# Patient Record
Sex: FEMALE | Race: ASIAN | NOT HISPANIC OR LATINO | URBAN - METROPOLITAN AREA
[De-identification: names, ages, dates, MRNs, and addresses within clinical notes are randomized per-mention and may not be internally consistent; named-entity substitution may affect disease eponyms.]

---

## 2017-03-01 ENCOUNTER — INPATIENT (INPATIENT)
Facility: HOSPITAL | Age: 82
LOS: 7 days | Discharge: NURSING, REHAB OR LONG-TERM CARE | DRG: 310 | End: 2017-03-09
Attending: FAMILY MEDICINE | Admitting: FAMILY MEDICINE
Payer: MEDICARE

## 2017-03-01 VITALS
DIASTOLIC BLOOD PRESSURE: 78 MMHG | TEMPERATURE: 99 F | SYSTOLIC BLOOD PRESSURE: 167 MMHG | OXYGEN SATURATION: 97 % | RESPIRATION RATE: 18 BRPM | HEART RATE: 63 BPM

## 2017-03-01 LAB
ALBUMIN SERPL ELPH-MCNC: 4.3 G/DL — SIGNIFICANT CHANGE UP (ref 3.3–5)
ALP SERPL-CCNC: 60 U/L — SIGNIFICANT CHANGE UP (ref 40–120)
ALT FLD-CCNC: 16 U/L RC — SIGNIFICANT CHANGE UP (ref 10–45)
ANION GAP SERPL CALC-SCNC: 18 MMOL/L — HIGH (ref 5–17)
AST SERPL-CCNC: 18 U/L — SIGNIFICANT CHANGE UP (ref 10–40)
BASOPHILS # BLD AUTO: 0.1 K/UL — SIGNIFICANT CHANGE UP (ref 0–0.2)
BASOPHILS NFR BLD AUTO: 0.6 % — SIGNIFICANT CHANGE UP (ref 0–2)
BILIRUB SERPL-MCNC: 0.2 MG/DL — SIGNIFICANT CHANGE UP (ref 0.2–1.2)
BUN SERPL-MCNC: 32 MG/DL — HIGH (ref 7–23)
CALCIUM SERPL-MCNC: 10.1 MG/DL — SIGNIFICANT CHANGE UP (ref 8.4–10.5)
CHLORIDE SERPL-SCNC: 96 MMOL/L — SIGNIFICANT CHANGE UP (ref 96–108)
CK SERPL-CCNC: 121 U/L — SIGNIFICANT CHANGE UP (ref 25–170)
CO2 SERPL-SCNC: 25 MMOL/L — SIGNIFICANT CHANGE UP (ref 22–31)
CREAT SERPL-MCNC: 0.96 MG/DL — SIGNIFICANT CHANGE UP (ref 0.5–1.3)
EOSINOPHIL # BLD AUTO: 0.1 K/UL — SIGNIFICANT CHANGE UP (ref 0–0.5)
EOSINOPHIL NFR BLD AUTO: 1.1 % — SIGNIFICANT CHANGE UP (ref 0–6)
GAS PNL BLDV: SIGNIFICANT CHANGE UP
GLUCOSE SERPL-MCNC: 179 MG/DL — HIGH (ref 70–99)
HCT VFR BLD CALC: 36.8 % — SIGNIFICANT CHANGE UP (ref 34.5–45)
HGB BLD-MCNC: 13.2 G/DL — SIGNIFICANT CHANGE UP (ref 11.5–15.5)
LYMPHOCYTES # BLD AUTO: 1.9 K/UL — SIGNIFICANT CHANGE UP (ref 1–3.3)
LYMPHOCYTES # BLD AUTO: 19.8 % — SIGNIFICANT CHANGE UP (ref 13–44)
MCHC RBC-ENTMCNC: 33.9 PG — SIGNIFICANT CHANGE UP (ref 27–34)
MCHC RBC-ENTMCNC: 35.7 GM/DL — SIGNIFICANT CHANGE UP (ref 32–36)
MCV RBC AUTO: 95 FL — SIGNIFICANT CHANGE UP (ref 80–100)
MONOCYTES # BLD AUTO: 1 K/UL — HIGH (ref 0–0.9)
MONOCYTES NFR BLD AUTO: 10.5 % — SIGNIFICANT CHANGE UP (ref 2–14)
NEUTROPHILS # BLD AUTO: 6.4 K/UL — SIGNIFICANT CHANGE UP (ref 1.8–7.4)
NEUTROPHILS NFR BLD AUTO: 68 % — SIGNIFICANT CHANGE UP (ref 43–77)
PLATELET # BLD AUTO: 248 K/UL — SIGNIFICANT CHANGE UP (ref 150–400)
POTASSIUM SERPL-MCNC: 4.4 MMOL/L — SIGNIFICANT CHANGE UP (ref 3.5–5.3)
POTASSIUM SERPL-SCNC: 4.4 MMOL/L — SIGNIFICANT CHANGE UP (ref 3.5–5.3)
PROT SERPL-MCNC: 7.4 G/DL — SIGNIFICANT CHANGE UP (ref 6–8.3)
RBC # BLD: 3.88 M/UL — SIGNIFICANT CHANGE UP (ref 3.8–5.2)
RBC # FLD: 12.2 % — SIGNIFICANT CHANGE UP (ref 10.3–14.5)
SODIUM SERPL-SCNC: 139 MMOL/L — SIGNIFICANT CHANGE UP (ref 135–145)
TROPONIN T SERPL-MCNC: <0.01 NG/ML — SIGNIFICANT CHANGE UP (ref 0–0.06)
WBC # BLD: 9.4 K/UL — SIGNIFICANT CHANGE UP (ref 3.8–10.5)
WBC # FLD AUTO: 9.4 K/UL — SIGNIFICANT CHANGE UP (ref 3.8–10.5)

## 2017-03-01 PROCEDURE — 93010 ELECTROCARDIOGRAM REPORT: CPT

## 2017-03-01 PROCEDURE — 71010: CPT | Mod: 26

## 2017-03-01 PROCEDURE — 99284 EMERGENCY DEPT VISIT MOD MDM: CPT | Mod: 25

## 2017-03-01 RX ORDER — ACETAMINOPHEN 500 MG
975 TABLET ORAL ONCE
Qty: 0 | Refills: 0 | Status: COMPLETED | OUTPATIENT
Start: 2017-03-01 | End: 2017-03-01

## 2017-03-01 RX ADMIN — Medication 975 MILLIGRAM(S): at 22:21

## 2017-03-01 NOTE — ED ADULT NURSE NOTE - OBJECTIVE STATEMENT
Pt is a 89 yo female BIB daughters s/p unwitnessed fall at home. Pt is unsure of how/why she fell. Pt fell between 130 -5 she is unsure of why the fall happened. Pt c.o left knee pain and left forehead pain. Pt has a bruise to her left forehead. Pt denies any vision changes no N/V/D no CP or SOB no diff urinating. Pt denies any numbness or tingling. Pt usually uses a cane at home, but recently daughters have been trying to encourage her to use a walker. Pt has a pmh of DM and HTN

## 2017-03-01 NOTE — ED ADULT NURSE NOTE - PMH
Back pain    Diabetes Mellitus Type II    Diverticulitis    HTN (Hypertension)    Hypercholesteremia    Osteoporosis

## 2017-03-01 NOTE — ED PROVIDER NOTE - ATTENDING CONTRIBUTION TO CARE
Agree with resident history  Patient denies any cp , sob, n/v, palpitations, , GYN, ENT or URI sx or GI bleeding  No hx of CAD or MI  On exam, AVSS, thin, fragile, hematoma to left temple with no deformity or bean sign, mild ttp along cpsine and t spine, no deformity, no cw ttp or bruising, soft nt nd abdomen, ttp on left illiac crest, no femur ttp, + ttp in knee cap, no tp ttp,

## 2017-03-01 NOTE — ED PROVIDER NOTE - OBJECTIVE STATEMENT
89F phmx of htn, dm BIB by daughters from home sp unwitnessed fall. pt does not recall events preceding the fall. +head trauma. Was able to pull herself to stand and was ambulatory after (answered door to let daughter in). Pt endorse left forehead pain and left knee pain. Did not take otc meds for pain. Denies focal numbness or parasthesia. neck pain, change in vision, nausea or vomiting, fever, cough, rhinorrhea, rash, chest pain, sob, pleuritic pain, diarrhea, constipation, pain with eye movement or jaw opening. 89F phmx of htn, dm BIB by daughters from home sp unwitnessed fall. pt does not recall events preceding the fall. +head trauma. Was able to pull herself to stand and was ambulatory after (answered door to let daughter in). Pt endorse left forehead pain and left knee pain. Did not take otc meds for pain. Denies focal numbness or parasthesia. neck pain, change in vision, nausea or vomiting, fever, cough, rhinorrhea, rash, chest pain, sob, pleuritic pain, diarrhea, constipation, pain with eye movement or jaw opening.    PMD: Romeo Chu

## 2017-03-01 NOTE — ED PROVIDER NOTE - PROGRESS NOTE DETAILS
Discussed with Dr Fontana - would like to admit to keke elam. Unable to reach Dr. Elam  Will admit to unattached.

## 2017-03-01 NOTE — ED PROVIDER NOTE - MEDICAL DECISION MAKING DETAILS
sp unwitnessed fall of unknown etiology. PE significant for left frontal hematoma and thoracic spine tendnerness. will obtain labs, cth, xr, cardiac hadley and admit.

## 2017-03-01 NOTE — ED PROVIDER NOTE - PHYSICAL EXAMINATION
AAOX3, mild distress 2/2 pain. HEENT: left frontal hematoma, no bony depression. EOMI, no periorbital tenderness, pupils equal, +2 reactive, nares is clear, maxillae stable. OP atraumatic and clear. Neck Supple, no midline ttep, RRR, breaths sounds equal. no pleuritic pain, no chest wall crepitus or deformity. ABD S/NT/ND, left shoulder pain with extension, no ttp or deformity. midline tspine tenderness with deformity or ecchymosis,  l spine w/o tenderness. EXT warm, well perfused, Pain in left hip on flexion. pelvis stable. no pain with passive rom. tenderness over left patella without deformity or ecchymosis. No Edema, Distal Pulses Intact, No Rash,  MAEx4, Sensation to soft touch grossly intact, normal strength/tone.

## 2017-03-02 DIAGNOSIS — R55 SYNCOPE AND COLLAPSE: ICD-10-CM

## 2017-03-02 DIAGNOSIS — Z29.9 ENCOUNTER FOR PROPHYLACTIC MEASURES, UNSPECIFIED: ICD-10-CM

## 2017-03-02 DIAGNOSIS — I10 ESSENTIAL (PRIMARY) HYPERTENSION: ICD-10-CM

## 2017-03-02 DIAGNOSIS — E11.9 TYPE 2 DIABETES MELLITUS WITHOUT COMPLICATIONS: ICD-10-CM

## 2017-03-02 LAB
ANION GAP SERPL CALC-SCNC: 16 MMOL/L — SIGNIFICANT CHANGE UP (ref 5–17)
APPEARANCE UR: CLEAR — SIGNIFICANT CHANGE UP
BASOPHILS # BLD AUTO: 0.02 K/UL — SIGNIFICANT CHANGE UP (ref 0–0.2)
BASOPHILS NFR BLD AUTO: 0.2 % — SIGNIFICANT CHANGE UP (ref 0–2)
BILIRUB UR-MCNC: NEGATIVE — SIGNIFICANT CHANGE UP
BUN SERPL-MCNC: 25 MG/DL — HIGH (ref 7–23)
CALCIUM SERPL-MCNC: 9.9 MG/DL — SIGNIFICANT CHANGE UP (ref 8.4–10.5)
CHLORIDE SERPL-SCNC: 100 MMOL/L — SIGNIFICANT CHANGE UP (ref 96–108)
CK MB CFR SERPL CALC: 2.1 NG/ML — SIGNIFICANT CHANGE UP (ref 0–3.8)
CK MB CFR SERPL CALC: 3.1 NG/ML — SIGNIFICANT CHANGE UP (ref 0–3.8)
CK SERPL-CCNC: 78 U/L — SIGNIFICANT CHANGE UP (ref 25–170)
CK SERPL-CCNC: 92 U/L — SIGNIFICANT CHANGE UP (ref 25–170)
CO2 SERPL-SCNC: 24 MMOL/L — SIGNIFICANT CHANGE UP (ref 22–31)
COLOR SPEC: SIGNIFICANT CHANGE UP
CREAT SERPL-MCNC: 0.81 MG/DL — SIGNIFICANT CHANGE UP (ref 0.5–1.3)
DIFF PNL FLD: NEGATIVE — SIGNIFICANT CHANGE UP
EOSINOPHIL # BLD AUTO: 0.09 K/UL — SIGNIFICANT CHANGE UP (ref 0–0.5)
EOSINOPHIL NFR BLD AUTO: 1.1 % — SIGNIFICANT CHANGE UP (ref 0–6)
GLUCOSE SERPL-MCNC: 137 MG/DL — HIGH (ref 70–99)
GLUCOSE UR QL: NEGATIVE — SIGNIFICANT CHANGE UP
HCT VFR BLD CALC: 36.8 % — SIGNIFICANT CHANGE UP (ref 34.5–45)
HGB BLD-MCNC: 12.4 G/DL — SIGNIFICANT CHANGE UP (ref 11.5–15.5)
IMM GRANULOCYTES NFR BLD AUTO: 0.2 % — SIGNIFICANT CHANGE UP (ref 0–1.5)
KETONES UR-MCNC: NEGATIVE — SIGNIFICANT CHANGE UP
LEUKOCYTE ESTERASE UR-ACNC: ABNORMAL
LYMPHOCYTES # BLD AUTO: 2.03 K/UL — SIGNIFICANT CHANGE UP (ref 1–3.3)
LYMPHOCYTES # BLD AUTO: 23.7 % — SIGNIFICANT CHANGE UP (ref 13–44)
MAGNESIUM SERPL-MCNC: 1.8 MG/DL — SIGNIFICANT CHANGE UP (ref 1.6–2.6)
MCHC RBC-ENTMCNC: 32 PG — SIGNIFICANT CHANGE UP (ref 27–34)
MCHC RBC-ENTMCNC: 33.7 GM/DL — SIGNIFICANT CHANGE UP (ref 32–36)
MCV RBC AUTO: 94.8 FL — SIGNIFICANT CHANGE UP (ref 80–100)
MONOCYTES # BLD AUTO: 0.86 K/UL — SIGNIFICANT CHANGE UP (ref 0–0.9)
MONOCYTES NFR BLD AUTO: 10.1 % — SIGNIFICANT CHANGE UP (ref 2–14)
NEUTROPHILS # BLD AUTO: 5.53 K/UL — SIGNIFICANT CHANGE UP (ref 1.8–7.4)
NEUTROPHILS NFR BLD AUTO: 64.7 % — SIGNIFICANT CHANGE UP (ref 43–77)
NITRITE UR-MCNC: NEGATIVE — SIGNIFICANT CHANGE UP
PH UR: 7 — SIGNIFICANT CHANGE UP (ref 4.8–8)
PHOSPHATE SERPL-MCNC: 4.1 MG/DL — SIGNIFICANT CHANGE UP (ref 2.5–4.5)
PLATELET # BLD AUTO: 265 K/UL — SIGNIFICANT CHANGE UP (ref 150–400)
POTASSIUM SERPL-MCNC: 4.4 MMOL/L — SIGNIFICANT CHANGE UP (ref 3.5–5.3)
POTASSIUM SERPL-SCNC: 4.4 MMOL/L — SIGNIFICANT CHANGE UP (ref 3.5–5.3)
PROT UR-MCNC: NEGATIVE — SIGNIFICANT CHANGE UP
RBC # BLD: 3.88 M/UL — SIGNIFICANT CHANGE UP (ref 3.8–5.2)
RBC # FLD: 12.9 % — SIGNIFICANT CHANGE UP (ref 10.3–14.5)
RBC CASTS # UR COMP ASSIST: SIGNIFICANT CHANGE UP /HPF (ref 0–2)
SODIUM SERPL-SCNC: 140 MMOL/L — SIGNIFICANT CHANGE UP (ref 135–145)
SP GR SPEC: 1.01 — SIGNIFICANT CHANGE UP (ref 1.01–1.02)
TROPONIN T SERPL-MCNC: <0.01 NG/ML — SIGNIFICANT CHANGE UP (ref 0–0.06)
TROPONIN T SERPL-MCNC: <0.01 NG/ML — SIGNIFICANT CHANGE UP (ref 0–0.06)
UROBILINOGEN FLD QL: NEGATIVE — SIGNIFICANT CHANGE UP
WBC # BLD: 8.55 K/UL — SIGNIFICANT CHANGE UP (ref 3.8–10.5)
WBC # FLD AUTO: 8.55 K/UL — SIGNIFICANT CHANGE UP (ref 3.8–10.5)
WBC UR QL: SIGNIFICANT CHANGE UP /HPF (ref 0–5)

## 2017-03-02 PROCEDURE — 99222 1ST HOSP IP/OBS MODERATE 55: CPT

## 2017-03-02 PROCEDURE — 73610 X-RAY EXAM OF ANKLE: CPT | Mod: 26,50

## 2017-03-02 PROCEDURE — 73630 X-RAY EXAM OF FOOT: CPT | Mod: 26,50

## 2017-03-02 RX ORDER — DEXTROSE 50 % IN WATER 50 %
1 SYRINGE (ML) INTRAVENOUS ONCE
Qty: 0 | Refills: 0 | Status: DISCONTINUED | OUTPATIENT
Start: 2017-03-02 | End: 2017-03-09

## 2017-03-02 RX ORDER — ACETAMINOPHEN 500 MG
650 TABLET ORAL EVERY 6 HOURS
Qty: 0 | Refills: 0 | Status: DISCONTINUED | OUTPATIENT
Start: 2017-03-02 | End: 2017-03-09

## 2017-03-02 RX ORDER — HEPARIN SODIUM 5000 [USP'U]/ML
5000 INJECTION INTRAVENOUS; SUBCUTANEOUS EVERY 12 HOURS
Qty: 0 | Refills: 0 | Status: DISCONTINUED | OUTPATIENT
Start: 2017-03-02 | End: 2017-03-09

## 2017-03-02 RX ORDER — DEXTROSE 50 % IN WATER 50 %
12.5 SYRINGE (ML) INTRAVENOUS ONCE
Qty: 0 | Refills: 0 | Status: DISCONTINUED | OUTPATIENT
Start: 2017-03-02 | End: 2017-03-09

## 2017-03-02 RX ORDER — INSULIN LISPRO 100/ML
VIAL (ML) SUBCUTANEOUS
Qty: 0 | Refills: 0 | Status: DISCONTINUED | OUTPATIENT
Start: 2017-03-02 | End: 2017-03-09

## 2017-03-02 RX ORDER — ATORVASTATIN CALCIUM 80 MG/1
20 TABLET, FILM COATED ORAL AT BEDTIME
Qty: 0 | Refills: 0 | Status: DISCONTINUED | OUTPATIENT
Start: 2017-03-02 | End: 2017-03-09

## 2017-03-02 RX ORDER — INSULIN LISPRO 100/ML
VIAL (ML) SUBCUTANEOUS AT BEDTIME
Qty: 0 | Refills: 0 | Status: DISCONTINUED | OUTPATIENT
Start: 2017-03-02 | End: 2017-03-09

## 2017-03-02 RX ADMIN — ATORVASTATIN CALCIUM 20 MILLIGRAM(S): 80 TABLET, FILM COATED ORAL at 21:22

## 2017-03-02 RX ADMIN — Medication 650 MILLIGRAM(S): at 09:42

## 2017-03-02 RX ADMIN — Medication 650 MILLIGRAM(S): at 16:27

## 2017-03-02 RX ADMIN — HEPARIN SODIUM 5000 UNIT(S): 5000 INJECTION INTRAVENOUS; SUBCUTANEOUS at 06:38

## 2017-03-02 RX ADMIN — Medication 650 MILLIGRAM(S): at 17:00

## 2017-03-02 RX ADMIN — HEPARIN SODIUM 5000 UNIT(S): 5000 INJECTION INTRAVENOUS; SUBCUTANEOUS at 17:26

## 2017-03-02 RX ADMIN — Medication 6: at 17:32

## 2017-03-02 RX ADMIN — Medication 4: at 14:29

## 2017-03-02 NOTE — H&P ADULT. - ASSESSMENT
The patient is a healthy 89 Y.o. female with pmh of T2DM, HTN, who presents s/p un witnessed fall at home.

## 2017-03-02 NOTE — H&P ADULT. - HISTORY OF PRESENT ILLNESS
The patient is a healthy 89 Y.o. female with pmh of T2DM, HTN, who presents s/p un witnessed fall at home.     The patient is usually functional, does adl's, supposed to use a walker but does at times. She has had mechanical falls in the past. She was found to have fallen at home. She was last seen by daughter around lunch time. When she came back home the patient opened the door for her but was found to have bruise on the left face. She told her daugher she does not remember when/how she fell and does not remember how long she was down for. She denies and prodromal symptoms but doesn't remember what she was doing prior to her fall. She remembers coming to and being unable to arise easily. She was able to sit up and when her daughter came was able to open the door for her. No CP/SOB/ light headedness, no change in vison, or stool/urinary incontinence to togue biting. No change in exercise capacity. The patient was brought to the ED.     In the ED CT scan of head, neck, thoracic was negative for acute pathology. Admitted to tele. Of note the daughter states her mom has a history of valve issues where it does not close all the way. She however did not know any further details.

## 2017-03-02 NOTE — H&P ADULT. - PROBLEM SELECTOR PLAN 1
S/P fall, history of mechanical falls, no sz like activity, unclear how long, unwitnessed, history of possible valvular disease, systolic murmur on exam.   Mechanical vs cardiogenic.   Check orthostatics,   Check TTE, monitor on TELE.  Hold BB for now.   PT nicholas.

## 2017-03-03 LAB
ANION GAP SERPL CALC-SCNC: 14 MMOL/L — SIGNIFICANT CHANGE UP (ref 5–17)
BUN SERPL-MCNC: 18 MG/DL — SIGNIFICANT CHANGE UP (ref 7–23)
CALCIUM SERPL-MCNC: 9.3 MG/DL — SIGNIFICANT CHANGE UP (ref 8.4–10.5)
CHLORIDE SERPL-SCNC: 102 MMOL/L — SIGNIFICANT CHANGE UP (ref 96–108)
CK MB BLD-MCNC: 2.9 % — SIGNIFICANT CHANGE UP (ref 0–3.5)
CK MB CFR SERPL CALC: 3 NG/ML — SIGNIFICANT CHANGE UP (ref 0–3.8)
CK SERPL-CCNC: 104 U/L — SIGNIFICANT CHANGE UP (ref 25–170)
CO2 SERPL-SCNC: 25 MMOL/L — SIGNIFICANT CHANGE UP (ref 22–31)
CREAT SERPL-MCNC: 0.68 MG/DL — SIGNIFICANT CHANGE UP (ref 0.5–1.3)
GLUCOSE SERPL-MCNC: 157 MG/DL — HIGH (ref 70–99)
HBA1C BLD-MCNC: 7.8 % — HIGH (ref 4–5.6)
HCT VFR BLD CALC: 36.5 % — SIGNIFICANT CHANGE UP (ref 34.5–45)
HGB BLD-MCNC: 12.7 G/DL — SIGNIFICANT CHANGE UP (ref 11.5–15.5)
MCHC RBC-ENTMCNC: 33.1 PG — SIGNIFICANT CHANGE UP (ref 27–34)
MCHC RBC-ENTMCNC: 34.8 GM/DL — SIGNIFICANT CHANGE UP (ref 32–36)
MCV RBC AUTO: 95.1 FL — SIGNIFICANT CHANGE UP (ref 80–100)
PLATELET # BLD AUTO: 227 K/UL — SIGNIFICANT CHANGE UP (ref 150–400)
POTASSIUM SERPL-MCNC: 3.7 MMOL/L — SIGNIFICANT CHANGE UP (ref 3.5–5.3)
POTASSIUM SERPL-SCNC: 3.7 MMOL/L — SIGNIFICANT CHANGE UP (ref 3.5–5.3)
RBC # BLD: 3.84 M/UL — SIGNIFICANT CHANGE UP (ref 3.8–5.2)
RBC # FLD: 12 % — SIGNIFICANT CHANGE UP (ref 10.3–14.5)
SODIUM SERPL-SCNC: 141 MMOL/L — SIGNIFICANT CHANGE UP (ref 135–145)
TROPONIN T SERPL-MCNC: <0.01 NG/ML — SIGNIFICANT CHANGE UP (ref 0–0.06)
WBC # BLD: 11.8 K/UL — HIGH (ref 3.8–10.5)
WBC # FLD AUTO: 11.8 K/UL — HIGH (ref 3.8–10.5)

## 2017-03-03 PROCEDURE — 93306 TTE W/DOPPLER COMPLETE: CPT | Mod: 26

## 2017-03-03 PROCEDURE — 99232 SBSQ HOSP IP/OBS MODERATE 35: CPT

## 2017-03-03 RX ORDER — METOPROLOL TARTRATE 50 MG
25 TABLET ORAL DAILY
Qty: 0 | Refills: 0 | Status: DISCONTINUED | OUTPATIENT
Start: 2017-03-03 | End: 2017-03-05

## 2017-03-03 RX ADMIN — Medication 650 MILLIGRAM(S): at 22:15

## 2017-03-03 RX ADMIN — Medication 650 MILLIGRAM(S): at 11:33

## 2017-03-03 RX ADMIN — ATORVASTATIN CALCIUM 20 MILLIGRAM(S): 80 TABLET, FILM COATED ORAL at 21:37

## 2017-03-03 RX ADMIN — Medication: at 18:12

## 2017-03-03 RX ADMIN — Medication 650 MILLIGRAM(S): at 21:38

## 2017-03-03 RX ADMIN — HEPARIN SODIUM 5000 UNIT(S): 5000 INJECTION INTRAVENOUS; SUBCUTANEOUS at 17:25

## 2017-03-03 RX ADMIN — HEPARIN SODIUM 5000 UNIT(S): 5000 INJECTION INTRAVENOUS; SUBCUTANEOUS at 05:33

## 2017-03-03 RX ADMIN — Medication: at 13:02

## 2017-03-03 RX ADMIN — Medication 650 MILLIGRAM(S): at 09:55

## 2017-03-03 RX ADMIN — Medication 25 MILLIGRAM(S): at 17:27

## 2017-03-03 NOTE — PHYSICAL THERAPY INITIAL EVALUATION ADULT - ADDITIONAL COMMENTS
PTA pt independent ambulating with tripod cane occasionally, doesn't use much in the house, with multiple falls. pt has RW doesn't use in her apt. daughter Leilani assists with ADLs grocery shopping, cleaning. pt independent toileting/dressing/bathing. pt refusing to move in with daughters.

## 2017-03-03 NOTE — PHYSICAL THERAPY INITIAL EVALUATION ADULT - TRANSFER SAFETY CONCERNS NOTED: SIT/STAND, REHAB EVAL
decreased safety awareness/decreased weight-shifting ability/losing balance/decreased sequencing ability/decreased balance during turns/decreased step length

## 2017-03-03 NOTE — PHYSICAL THERAPY INITIAL EVALUATION ADULT - PERTINENT HX OF CURRENT PROBLEM, REHAB EVAL
pt is a 89 y.o female hx DM2, HTN, multiple falls presenting s/p unwitnessed fall at home, pt cannot recall why she fell. Cxray unchanged STEVEN calcified granuloma. CT head sm L frontal extracalvarial hematoma. All xray neg fx.

## 2017-03-03 NOTE — PHYSICAL THERAPY INITIAL EVALUATION ADULT - GAIT DEVIATIONS NOTED, PT EVAL
decreased step length/decreased suzette/increased stride width/decreased weight-shifting ability/LLE ER/decreased stride length

## 2017-03-03 NOTE — PHYSICAL THERAPY INITIAL EVALUATION ADULT - PERSONAL SAFETY AND JUDGMENT, REHAB EVAL
at risk behaviors demonstrated/impaired/impulsive, refuses to use % of the time despite unsteadiness

## 2017-03-04 LAB
ANION GAP SERPL CALC-SCNC: 14 MMOL/L — SIGNIFICANT CHANGE UP (ref 5–17)
BUN SERPL-MCNC: 21 MG/DL — SIGNIFICANT CHANGE UP (ref 7–23)
CALCIUM SERPL-MCNC: 8.9 MG/DL — SIGNIFICANT CHANGE UP (ref 8.4–10.5)
CHLORIDE SERPL-SCNC: 103 MMOL/L — SIGNIFICANT CHANGE UP (ref 96–108)
CO2 SERPL-SCNC: 23 MMOL/L — SIGNIFICANT CHANGE UP (ref 22–31)
CREAT SERPL-MCNC: 0.8 MG/DL — SIGNIFICANT CHANGE UP (ref 0.5–1.3)
GLUCOSE SERPL-MCNC: 151 MG/DL — HIGH (ref 70–99)
POTASSIUM SERPL-MCNC: 4.1 MMOL/L — SIGNIFICANT CHANGE UP (ref 3.5–5.3)
POTASSIUM SERPL-SCNC: 4.1 MMOL/L — SIGNIFICANT CHANGE UP (ref 3.5–5.3)
SODIUM SERPL-SCNC: 140 MMOL/L — SIGNIFICANT CHANGE UP (ref 135–145)

## 2017-03-04 RX ADMIN — HEPARIN SODIUM 5000 UNIT(S): 5000 INJECTION INTRAVENOUS; SUBCUTANEOUS at 17:43

## 2017-03-04 RX ADMIN — HEPARIN SODIUM 5000 UNIT(S): 5000 INJECTION INTRAVENOUS; SUBCUTANEOUS at 06:19

## 2017-03-04 RX ADMIN — Medication 25 MILLIGRAM(S): at 06:19

## 2017-03-04 RX ADMIN — Medication 650 MILLIGRAM(S): at 22:11

## 2017-03-04 RX ADMIN — Medication 8: at 13:07

## 2017-03-04 RX ADMIN — Medication 650 MILLIGRAM(S): at 22:41

## 2017-03-04 RX ADMIN — ATORVASTATIN CALCIUM 20 MILLIGRAM(S): 80 TABLET, FILM COATED ORAL at 22:11

## 2017-03-04 RX ADMIN — Medication 2: at 17:41

## 2017-03-05 RX ORDER — METOPROLOL TARTRATE 50 MG
25 TABLET ORAL EVERY 12 HOURS
Qty: 0 | Refills: 0 | Status: DISCONTINUED | OUTPATIENT
Start: 2017-03-05 | End: 2017-03-09

## 2017-03-05 RX ORDER — AMLODIPINE BESYLATE 2.5 MG/1
5 TABLET ORAL DAILY
Qty: 0 | Refills: 0 | Status: DISCONTINUED | OUTPATIENT
Start: 2017-03-05 | End: 2017-03-09

## 2017-03-05 RX ADMIN — Medication 650 MILLIGRAM(S): at 12:25

## 2017-03-05 RX ADMIN — Medication 25 MILLIGRAM(S): at 06:10

## 2017-03-05 RX ADMIN — Medication 650 MILLIGRAM(S): at 10:43

## 2017-03-05 RX ADMIN — Medication: at 13:30

## 2017-03-05 RX ADMIN — HEPARIN SODIUM 5000 UNIT(S): 5000 INJECTION INTRAVENOUS; SUBCUTANEOUS at 17:56

## 2017-03-05 RX ADMIN — Medication 2: at 17:51

## 2017-03-05 RX ADMIN — HEPARIN SODIUM 5000 UNIT(S): 5000 INJECTION INTRAVENOUS; SUBCUTANEOUS at 06:10

## 2017-03-05 RX ADMIN — Medication 25 MILLIGRAM(S): at 17:56

## 2017-03-05 RX ADMIN — ATORVASTATIN CALCIUM 20 MILLIGRAM(S): 80 TABLET, FILM COATED ORAL at 22:38

## 2017-03-05 RX ADMIN — AMLODIPINE BESYLATE 5 MILLIGRAM(S): 2.5 TABLET ORAL at 09:56

## 2017-03-06 PROCEDURE — 95957 EEG DIGITAL ANALYSIS: CPT | Mod: 26

## 2017-03-06 PROCEDURE — 93880 EXTRACRANIAL BILAT STUDY: CPT | Mod: 26

## 2017-03-06 PROCEDURE — 95819 EEG AWAKE AND ASLEEP: CPT | Mod: 26

## 2017-03-06 RX ADMIN — ATORVASTATIN CALCIUM 20 MILLIGRAM(S): 80 TABLET, FILM COATED ORAL at 20:33

## 2017-03-06 RX ADMIN — AMLODIPINE BESYLATE 5 MILLIGRAM(S): 2.5 TABLET ORAL at 05:54

## 2017-03-06 RX ADMIN — Medication 6: at 13:13

## 2017-03-06 RX ADMIN — HEPARIN SODIUM 5000 UNIT(S): 5000 INJECTION INTRAVENOUS; SUBCUTANEOUS at 05:53

## 2017-03-06 RX ADMIN — Medication 25 MILLIGRAM(S): at 17:55

## 2017-03-06 RX ADMIN — Medication 1: at 21:59

## 2017-03-06 RX ADMIN — Medication 650 MILLIGRAM(S): at 05:56

## 2017-03-06 RX ADMIN — HEPARIN SODIUM 5000 UNIT(S): 5000 INJECTION INTRAVENOUS; SUBCUTANEOUS at 17:55

## 2017-03-06 RX ADMIN — Medication: at 18:14

## 2017-03-06 RX ADMIN — Medication 25 MILLIGRAM(S): at 05:54

## 2017-03-06 RX ADMIN — Medication: at 09:12

## 2017-03-06 NOTE — PROVIDER CONTACT NOTE (OTHER) - ACTION/TREATMENT ORDERED:
NP made aware; no further intervention at this time; Continue to monitor patient
NP made aware; give 0600 Toprol as ordered; Continue to monitor patient
NP made aware; ok to give Tylenol as ordered; Reassess BP @ 0000; Continue to monitor patient

## 2017-03-06 NOTE — PROVIDER CONTACT NOTE (OTHER) - NAME OF MD/NP/PA/DO NOTIFIED:
MANOLO Agee [facility]
MANOLO Agee [facility]
MANOLO Agee [facility]

## 2017-03-07 LAB
BASOPHILS # BLD AUTO: 0 K/UL — SIGNIFICANT CHANGE UP (ref 0–0.2)
BASOPHILS NFR BLD AUTO: 0.2 % — SIGNIFICANT CHANGE UP (ref 0–2)
EOSINOPHIL # BLD AUTO: 0.2 K/UL — SIGNIFICANT CHANGE UP (ref 0–0.5)
EOSINOPHIL NFR BLD AUTO: 2.3 % — SIGNIFICANT CHANGE UP (ref 0–6)
HCT VFR BLD CALC: 36.1 % — SIGNIFICANT CHANGE UP (ref 34.5–45)
HGB BLD-MCNC: 12.2 G/DL — SIGNIFICANT CHANGE UP (ref 11.5–15.5)
LYMPHOCYTES # BLD AUTO: 2 K/UL — SIGNIFICANT CHANGE UP (ref 1–3.3)
LYMPHOCYTES # BLD AUTO: 27.1 % — SIGNIFICANT CHANGE UP (ref 13–44)
MCHC RBC-ENTMCNC: 32.9 PG — SIGNIFICANT CHANGE UP (ref 27–34)
MCHC RBC-ENTMCNC: 33.8 GM/DL — SIGNIFICANT CHANGE UP (ref 32–36)
MCV RBC AUTO: 97.4 FL — SIGNIFICANT CHANGE UP (ref 80–100)
MONOCYTES # BLD AUTO: 0.8 K/UL — SIGNIFICANT CHANGE UP (ref 0–0.9)
MONOCYTES NFR BLD AUTO: 11 % — SIGNIFICANT CHANGE UP (ref 2–14)
NEUTROPHILS # BLD AUTO: 4.4 K/UL — SIGNIFICANT CHANGE UP (ref 1.8–7.4)
NEUTROPHILS NFR BLD AUTO: 59.4 % — SIGNIFICANT CHANGE UP (ref 43–77)
PLATELET # BLD AUTO: 227 K/UL — SIGNIFICANT CHANGE UP (ref 150–400)
RBC # BLD: 3.7 M/UL — LOW (ref 3.8–5.2)
RBC # FLD: 12.1 % — SIGNIFICANT CHANGE UP (ref 10.3–14.5)
WBC # BLD: 7.4 K/UL — SIGNIFICANT CHANGE UP (ref 3.8–10.5)
WBC # FLD AUTO: 7.4 K/UL — SIGNIFICANT CHANGE UP (ref 3.8–10.5)

## 2017-03-07 RX ADMIN — Medication 4: at 13:17

## 2017-03-07 RX ADMIN — HEPARIN SODIUM 5000 UNIT(S): 5000 INJECTION INTRAVENOUS; SUBCUTANEOUS at 17:47

## 2017-03-07 RX ADMIN — HEPARIN SODIUM 5000 UNIT(S): 5000 INJECTION INTRAVENOUS; SUBCUTANEOUS at 05:12

## 2017-03-07 RX ADMIN — Medication 25 MILLIGRAM(S): at 17:47

## 2017-03-07 RX ADMIN — Medication 2: at 17:47

## 2017-03-07 RX ADMIN — Medication: at 09:28

## 2017-03-07 RX ADMIN — AMLODIPINE BESYLATE 5 MILLIGRAM(S): 2.5 TABLET ORAL at 05:12

## 2017-03-07 RX ADMIN — Medication 25 MILLIGRAM(S): at 05:12

## 2017-03-07 RX ADMIN — ATORVASTATIN CALCIUM 20 MILLIGRAM(S): 80 TABLET, FILM COATED ORAL at 21:10

## 2017-03-07 NOTE — DIETITIAN INITIAL EVALUATION ADULT. - OTHER INFO
Daughter states that pt's weight has remains stable at 89-90pounds. Noted per chart BMI 13.2- however, daughter states pt's ht is 4ft 8in; noted ht per chart of 3xl50gl; d/w RN. BMI recalculated as 20 based on stated ht of 4ft8in and stated wt of 89pounds. Noted admission weight 94.7 pounds and current weight per chart 91.9 pounds. No known food allergies or intolerances reported. Supplementation PTA consisted of vitamin B12, vitamin E, vitamin D, and biotin. Daughter states that pt does not self monitor blood glucose levels, possible because she forgets. Pt currently with a good appetite and eating >% of meals. Daughter states pt with no GI distress and no difficulty chewing/swallowing. Daughter receptive to instruction on strategies to promote glycemic control, written materials on nutrition therapy for DM and heart health provided at request of daughter.

## 2017-03-07 NOTE — DIETITIAN INITIAL EVALUATION ADULT. - NS AS NUTRI INTERV ED CONTENT
Reviewed nutrition therapy for DM and heart health, written materials provided./Nutrition relationship to health/disease/Recommended modifications/Purpose of the nutrition education

## 2017-03-07 NOTE — DIETITIAN INITIAL EVALUATION ADULT. - NS AS NUTRI INTERV MEALS SNACK
Encourage PO intake with all meals. Provide food preferences within therapeutic diet when requested. Reviewed alternate menu options and menu ordering procedure.

## 2017-03-07 NOTE — DIETITIAN INITIAL EVALUATION ADULT. - ENERGY NEEDS
Ht:4ft8in, Wt:89pounds (stated), BMI:20,   IBW:85pounds (+/-10%), 96%IBW  Pertinent Information: Pt admitted s/p syncope ? secondary to bradycardia, ?dementia as per chart. No pressure ulcers or edema noted.

## 2017-03-07 NOTE — DIETITIAN INITIAL EVALUATION ADULT. - ADHERENCE
Daughter states pt does not strictly adhere to any therapeutic diet guidelines but eats healthy. Breakfast is an egg with ham and bread. Lunch and dinner are brown rice with vegetables and chicken or fish. Daughter states that pt lives alone, but family prepares meals and brings them to pt daily. Pt will snack on fruit or diet cookies.

## 2017-03-08 PROCEDURE — 70551 MRI BRAIN STEM W/O DYE: CPT | Mod: 26

## 2017-03-08 RX ADMIN — Medication 6: at 18:06

## 2017-03-08 RX ADMIN — Medication 25 MILLIGRAM(S): at 05:30

## 2017-03-08 RX ADMIN — Medication 4: at 13:00

## 2017-03-08 RX ADMIN — HEPARIN SODIUM 5000 UNIT(S): 5000 INJECTION INTRAVENOUS; SUBCUTANEOUS at 05:30

## 2017-03-08 RX ADMIN — AMLODIPINE BESYLATE 5 MILLIGRAM(S): 2.5 TABLET ORAL at 05:30

## 2017-03-08 RX ADMIN — Medication: at 09:08

## 2017-03-08 RX ADMIN — HEPARIN SODIUM 5000 UNIT(S): 5000 INJECTION INTRAVENOUS; SUBCUTANEOUS at 18:07

## 2017-03-08 RX ADMIN — Medication 2: at 21:47

## 2017-03-08 RX ADMIN — Medication 25 MILLIGRAM(S): at 18:08

## 2017-03-08 RX ADMIN — ATORVASTATIN CALCIUM 20 MILLIGRAM(S): 80 TABLET, FILM COATED ORAL at 21:47

## 2017-03-09 VITALS
TEMPERATURE: 98 F | DIASTOLIC BLOOD PRESSURE: 65 MMHG | SYSTOLIC BLOOD PRESSURE: 152 MMHG | HEART RATE: 56 BPM | RESPIRATION RATE: 17 BRPM | OXYGEN SATURATION: 98 %

## 2017-03-09 LAB
ANION GAP SERPL CALC-SCNC: 14 MMOL/L — SIGNIFICANT CHANGE UP (ref 5–17)
BUN SERPL-MCNC: 35 MG/DL — HIGH (ref 7–23)
CALCIUM SERPL-MCNC: 9 MG/DL — SIGNIFICANT CHANGE UP (ref 8.4–10.5)
CHLORIDE SERPL-SCNC: 104 MMOL/L — SIGNIFICANT CHANGE UP (ref 96–108)
CO2 SERPL-SCNC: 23 MMOL/L — SIGNIFICANT CHANGE UP (ref 22–31)
CREAT SERPL-MCNC: 0.96 MG/DL — SIGNIFICANT CHANGE UP (ref 0.5–1.3)
GLUCOSE SERPL-MCNC: 221 MG/DL — HIGH (ref 70–99)
HCT VFR BLD CALC: 36.6 % — SIGNIFICANT CHANGE UP (ref 34.5–45)
HGB BLD-MCNC: 12.2 G/DL — SIGNIFICANT CHANGE UP (ref 11.5–15.5)
MCHC RBC-ENTMCNC: 32.6 PG — SIGNIFICANT CHANGE UP (ref 27–34)
MCHC RBC-ENTMCNC: 33.4 GM/DL — SIGNIFICANT CHANGE UP (ref 32–36)
MCV RBC AUTO: 97.5 FL — SIGNIFICANT CHANGE UP (ref 80–100)
PLATELET # BLD AUTO: 245 K/UL — SIGNIFICANT CHANGE UP (ref 150–400)
POTASSIUM SERPL-MCNC: 4.5 MMOL/L — SIGNIFICANT CHANGE UP (ref 3.5–5.3)
POTASSIUM SERPL-SCNC: 4.5 MMOL/L — SIGNIFICANT CHANGE UP (ref 3.5–5.3)
RBC # BLD: 3.75 M/UL — LOW (ref 3.8–5.2)
RBC # FLD: 12.1 % — SIGNIFICANT CHANGE UP (ref 10.3–14.5)
SODIUM SERPL-SCNC: 141 MMOL/L — SIGNIFICANT CHANGE UP (ref 135–145)
WBC # BLD: 7.1 K/UL — SIGNIFICANT CHANGE UP (ref 3.8–10.5)
WBC # FLD AUTO: 7.1 K/UL — SIGNIFICANT CHANGE UP (ref 3.8–10.5)

## 2017-03-09 PROCEDURE — 85014 HEMATOCRIT: CPT

## 2017-03-09 PROCEDURE — 84295 ASSAY OF SERUM SODIUM: CPT

## 2017-03-09 PROCEDURE — 70551 MRI BRAIN STEM W/O DYE: CPT

## 2017-03-09 PROCEDURE — 95957 EEG DIGITAL ANALYSIS: CPT

## 2017-03-09 PROCEDURE — 83036 HEMOGLOBIN GLYCOSYLATED A1C: CPT

## 2017-03-09 PROCEDURE — 82947 ASSAY GLUCOSE BLOOD QUANT: CPT

## 2017-03-09 PROCEDURE — 83605 ASSAY OF LACTIC ACID: CPT

## 2017-03-09 PROCEDURE — 81001 URINALYSIS AUTO W/SCOPE: CPT

## 2017-03-09 PROCEDURE — 82550 ASSAY OF CK (CPK): CPT

## 2017-03-09 PROCEDURE — 72125 CT NECK SPINE W/O DYE: CPT

## 2017-03-09 PROCEDURE — 97161 PT EVAL LOW COMPLEX 20 MIN: CPT

## 2017-03-09 PROCEDURE — 83735 ASSAY OF MAGNESIUM: CPT

## 2017-03-09 PROCEDURE — 84132 ASSAY OF SERUM POTASSIUM: CPT

## 2017-03-09 PROCEDURE — 73562 X-RAY EXAM OF KNEE 3: CPT

## 2017-03-09 PROCEDURE — 82553 CREATINE MB FRACTION: CPT

## 2017-03-09 PROCEDURE — 80048 BASIC METABOLIC PNL TOTAL CA: CPT

## 2017-03-09 PROCEDURE — 95819 EEG AWAKE AND ASLEEP: CPT

## 2017-03-09 PROCEDURE — 82330 ASSAY OF CALCIUM: CPT

## 2017-03-09 PROCEDURE — 73502 X-RAY EXAM HIP UNI 2-3 VIEWS: CPT

## 2017-03-09 PROCEDURE — 97530 THERAPEUTIC ACTIVITIES: CPT

## 2017-03-09 PROCEDURE — 80053 COMPREHEN METABOLIC PANEL: CPT

## 2017-03-09 PROCEDURE — 99285 EMERGENCY DEPT VISIT HI MDM: CPT | Mod: 25

## 2017-03-09 PROCEDURE — 84484 ASSAY OF TROPONIN QUANT: CPT

## 2017-03-09 PROCEDURE — 82435 ASSAY OF BLOOD CHLORIDE: CPT

## 2017-03-09 PROCEDURE — 85027 COMPLETE CBC AUTOMATED: CPT

## 2017-03-09 PROCEDURE — 73630 X-RAY EXAM OF FOOT: CPT

## 2017-03-09 PROCEDURE — 71250 CT THORAX DX C-: CPT

## 2017-03-09 PROCEDURE — 73030 X-RAY EXAM OF SHOULDER: CPT

## 2017-03-09 PROCEDURE — 73610 X-RAY EXAM OF ANKLE: CPT

## 2017-03-09 PROCEDURE — 84100 ASSAY OF PHOSPHORUS: CPT

## 2017-03-09 PROCEDURE — 70450 CT HEAD/BRAIN W/O DYE: CPT

## 2017-03-09 PROCEDURE — 82803 BLOOD GASES ANY COMBINATION: CPT

## 2017-03-09 PROCEDURE — 73110 X-RAY EXAM OF WRIST: CPT

## 2017-03-09 PROCEDURE — 93880 EXTRACRANIAL BILAT STUDY: CPT

## 2017-03-09 PROCEDURE — 97116 GAIT TRAINING THERAPY: CPT

## 2017-03-09 PROCEDURE — 71045 X-RAY EXAM CHEST 1 VIEW: CPT

## 2017-03-09 PROCEDURE — 93005 ELECTROCARDIOGRAM TRACING: CPT

## 2017-03-09 PROCEDURE — 93306 TTE W/DOPPLER COMPLETE: CPT

## 2017-03-09 RX ORDER — AMLODIPINE BESYLATE 2.5 MG/1
1 TABLET ORAL
Qty: 0 | Refills: 0 | COMMUNITY
Start: 2017-03-09

## 2017-03-09 RX ORDER — CHOLECALCIFEROL (VITAMIN D3) 125 MCG
1 CAPSULE ORAL
Qty: 0 | Refills: 0 | COMMUNITY

## 2017-03-09 RX ORDER — VITAMIN E 100 UNIT
1 CAPSULE ORAL
Qty: 0 | Refills: 0 | COMMUNITY

## 2017-03-09 RX ORDER — PREGABALIN 225 MG/1
1 CAPSULE ORAL
Qty: 0 | Refills: 0 | COMMUNITY

## 2017-03-09 RX ORDER — ACETAMINOPHEN 500 MG
2 TABLET ORAL
Qty: 0 | Refills: 0 | COMMUNITY
Start: 2017-03-09

## 2017-03-09 RX ORDER — METOPROLOL TARTRATE 50 MG
1 TABLET ORAL
Qty: 0 | Refills: 0 | COMMUNITY

## 2017-03-09 RX ORDER — METFORMIN HYDROCHLORIDE 850 MG/1
1 TABLET ORAL
Qty: 0 | Refills: 0 | COMMUNITY

## 2017-03-09 RX ORDER — HEPARIN SODIUM 5000 [USP'U]/ML
5000 INJECTION INTRAVENOUS; SUBCUTANEOUS
Qty: 0 | Refills: 0 | COMMUNITY
Start: 2017-03-09

## 2017-03-09 RX ORDER — METOPROLOL TARTRATE 50 MG
1 TABLET ORAL
Qty: 0 | Refills: 0 | COMMUNITY
Start: 2017-03-09

## 2017-03-09 RX ADMIN — HEPARIN SODIUM 5000 UNIT(S): 5000 INJECTION INTRAVENOUS; SUBCUTANEOUS at 05:15

## 2017-03-09 RX ADMIN — Medication 2: at 09:29

## 2017-03-09 RX ADMIN — Medication 650 MILLIGRAM(S): at 07:24

## 2017-03-09 RX ADMIN — Medication 975 MILLIGRAM(S): at 07:24

## 2017-03-09 RX ADMIN — Medication 25 MILLIGRAM(S): at 05:15

## 2017-03-09 RX ADMIN — AMLODIPINE BESYLATE 5 MILLIGRAM(S): 2.5 TABLET ORAL at 05:15

## 2017-03-09 RX ADMIN — Medication 4: at 13:17

## 2017-03-09 NOTE — DISCHARGE NOTE ADULT - PLAN OF CARE
no further syncopal episodes now stable  Please have patient follow up with her Cardiologist and Neurology  as recommended Please have patient follow up with Neurologist, Dr Kent, within 1-2 weeks of discharge from rehab for repeat MRI head and continued workup/therapy HgA1C this admission; 7.8  Make sure you get your HgA1c checked every three months.  Please ensure that patient has a follow up appointment with her Endocrinologist after rehab Continue with supplements   Patient to follow up with her PMD for continuity of care Low salt diet  Activity as tolerated.  Take all medication as prescribed.  Follow up with your medical doctor for routine blood pressure monitoring at your next visit.  Notify your doctor if you have any of the following symptoms:   Dizziness, Lightheadedness, Blurry vision, Headache, Chest pain, Shortness of breath stable  Patient to follow up with her Primary Care Doctor and Neurologist as recommended MRI head reveals:  Please have patient follow up with Neurologist, Dr Kent, within 1-2 weeks of discharge from rehab for repeat MRI head and continued workup/therapy MRI head reveals: No acute intracranial hemorrhage, mass effect, or evidence of   acute territorial infarct. Nonspecific T2 and FLAIR hyperintense signal in the bilateral cerebral   white matter. Tiny foci of susceptibility artifact in the left centrum semiovale, left  thalamus, left putamen may represent chronic microhemorrhages or tiny  cavernomas;   Please have patient follow up with Neurologist, Dr Kent, within 1-2 weeks of discharge from rehab for repeat MRI head and continued workup/therapy

## 2017-03-09 NOTE — DISCHARGE NOTE ADULT - CARE PLAN
Principal Discharge DX:	Syncope  Goal:	no further syncopal episodes  Instructions for follow-up, activity and diet:	now stable  Please have patient follow up with her Cardiologist and Neurology  as recommended  Secondary Diagnosis:	Abnormal MRI of head  Instructions for follow-up, activity and diet:	Please have patient follow up with Neurologist, Dr Kent, within 1-2 weeks of discharge from rehab for repeat MRI head and continued workup/therapy  Secondary Diagnosis:	Type 2 diabetes mellitus  Instructions for follow-up, activity and diet:	HgA1C this admission; 7.8  Make sure you get your HgA1c checked every three months.  Please ensure that patient has a follow up appointment with her Endocrinologist after rehab  Secondary Diagnosis:	Osteoarthritis  Instructions for follow-up, activity and diet:	Continue with supplements   Patient to follow up with her PMD for continuity of care  Secondary Diagnosis:	HTN (Hypertension)  Instructions for follow-up, activity and diet:	Low salt diet  Activity as tolerated.  Take all medication as prescribed.  Follow up with your medical doctor for routine blood pressure monitoring at your next visit.  Notify your doctor if you have any of the following symptoms:   Dizziness, Lightheadedness, Blurry vision, Headache, Chest pain, Shortness of breath  Secondary Diagnosis:	Spondylosis  Instructions for follow-up, activity and diet:	stable  Patient to follow up with her Primary Care Doctor and Neurologist as recommended Principal Discharge DX:	Syncope  Goal:	no further syncopal episodes  Instructions for follow-up, activity and diet:	now stable  Please have patient follow up with her Cardiologist and Neurology  as recommended  Secondary Diagnosis:	Abnormal MRI of head  Instructions for follow-up, activity and diet:	MRI head reveals:  Please have patient follow up with Neurologist, Dr Kent, within 1-2 weeks of discharge from rehab for repeat MRI head and continued workup/therapy  Secondary Diagnosis:	Type 2 diabetes mellitus  Instructions for follow-up, activity and diet:	HgA1C this admission; 7.8  Make sure you get your HgA1c checked every three months.  Please ensure that patient has a follow up appointment with her Endocrinologist after rehab  Secondary Diagnosis:	Osteoarthritis  Instructions for follow-up, activity and diet:	Continue with supplements   Patient to follow up with her PMD for continuity of care  Secondary Diagnosis:	HTN (Hypertension)  Instructions for follow-up, activity and diet:	Low salt diet  Activity as tolerated.  Take all medication as prescribed.  Follow up with your medical doctor for routine blood pressure monitoring at your next visit.  Notify your doctor if you have any of the following symptoms:   Dizziness, Lightheadedness, Blurry vision, Headache, Chest pain, Shortness of breath  Secondary Diagnosis:	Spondylosis  Instructions for follow-up, activity and diet:	stable  Patient to follow up with her Primary Care Doctor and Neurologist as recommended Principal Discharge DX:	Syncope  Goal:	no further syncopal episodes  Instructions for follow-up, activity and diet:	now stable  Please have patient follow up with her Cardiologist and Neurology  as recommended  Secondary Diagnosis:	Abnormal MRI of head  Instructions for follow-up, activity and diet:	MRI head reveals: No acute intracranial hemorrhage, mass effect, or evidence of   acute territorial infarct. Nonspecific T2 and FLAIR hyperintense signal in the bilateral cerebral   white matter. Tiny foci of susceptibility artifact in the left centrum semiovale, left  thalamus, left putamen may represent chronic microhemorrhages or tiny  cavernomas;   Please have patient follow up with Neurologist, Dr Kent, within 1-2 weeks of discharge from rehab for repeat MRI head and continued workup/therapy  Secondary Diagnosis:	Type 2 diabetes mellitus  Instructions for follow-up, activity and diet:	HgA1C this admission; 7.8  Make sure you get your HgA1c checked every three months.  Please ensure that patient has a follow up appointment with her Endocrinologist after rehab  Secondary Diagnosis:	Osteoarthritis  Instructions for follow-up, activity and diet:	Continue with supplements   Patient to follow up with her PMD for continuity of care  Secondary Diagnosis:	HTN (Hypertension)  Instructions for follow-up, activity and diet:	Low salt diet  Activity as tolerated.  Take all medication as prescribed.  Follow up with your medical doctor for routine blood pressure monitoring at your next visit.  Notify your doctor if you have any of the following symptoms:   Dizziness, Lightheadedness, Blurry vision, Headache, Chest pain, Shortness of breath  Secondary Diagnosis:	Spondylosis  Instructions for follow-up, activity and diet:	stable  Patient to follow up with her Primary Care Doctor and Neurologist as recommended

## 2017-03-09 NOTE — DISCHARGE NOTE ADULT - MEDICATION SUMMARY - MEDICATIONS TO TAKE
I will START or STAY ON the medications listed below when I get home from the hospital:    l-methylfolate with b6-12  -- 1 tab(s) by mouth once a day  -- Indication: For Supplement    calcium 1200mg  -- 1 tab(s) by mouth once a day  -- Indication: For Supplement     acetaminophen 325 mg oral tablet  -- 2 tab(s) by mouth every 8 hours, As Needed -Pain  -- Indication: For Pain    heparin  -- 5000 unit(s) subcutaneous every 12 hours  dvt prophylaxis  -- Indication: For Dvt prophylaxis while at rehab     metFORMIN 500 mg oral tablet  -- 1 tab(s) by mouth 2 times a day  -- Indication: For Type 2 diabetes mellitus    Crestor 5 mg oral tablet  -- 1 tab(s) by mouth once a day (at bedtime)  -- Indication: For High cholesterol     metoprolol tartrate 25 mg oral tablet  -- 1 tab(s) by mouth every 12 hours  -- Indication: For HTN (Hypertension)    amLODIPine 5 mg oral tablet  -- 1 tab(s) by mouth once a day  -- Indication: For HTN (Hypertension)    biotin 5000 mcg oral tablet, disintegrating  -- 1 tab(s) by mouth once a day  -- Indication: For Supplement     Vitamin D3 2000 intl units oral capsule  -- 1 cap(s) by mouth every other day  -- Indication: For Supplement     vitamin E 400 intl units oral capsule  -- 1 cap(s) by mouth once a day  -- Indication: For Supplement

## 2017-03-09 NOTE — DISCHARGE NOTE ADULT - PATIENT PORTAL LINK FT
“You can access the Follow[facility] Patient Portal, offered by [facility], by registering with the following website: [email or web address]”

## 2017-03-09 NOTE — DISCHARGE NOTE ADULT - CARE PROVIDER_API CALL
Beverly Fontana), Medical Oncology  [address] [place] [address] [address]  [place] [address]  Phone: [phone]  Fax: [phone]    Luis Echeverria  Cardiologist  [address] [place] [address]  Phone: [phone]  Phone: (   )    -  Fax: (   )    -    Giovanna Orantes  Endocrinologist  [address] [place] [address]  Phone: [phone]  Phone: (   )    -  Fax: (   )    - Beverly Fontana), Medical Oncology  [address] [place] [address] [address]  [place] [address]  Phone: [phone]  Fax: [phone]    Luis Echeverria  Cardiologist  [address] [place] [address]  Phone: [phone]  Phone: (   )    -  Fax: (   )    -    Giovanna Orantes  Endocrinologist  [address] [place] [address]  Phone: [phone]  Phone: (   )    -  Fax: (   )    -    Ian Kent (DO), Neurology  [address] [place] [address]  Phone: [phone]  Fax: [phone]

## 2017-03-09 NOTE — DISCHARGE NOTE ADULT - ADDITIONAL INSTRUCTIONS
Please have patient follow up with Neurologist, Dr Kent, within 1-2 weeks of discharge from rehab for repeat MRI head and continued workup/therapy  Please ensure that patient has a follow up appointment with her Endocrinologist after rehab  Patient to follow up with her PMD within 2-3 weeks of discharge from rehab

## 2017-03-09 NOTE — DISCHARGE NOTE ADULT - PROVIDER TOKENS
TOKEN:'[id number]',FREE:[LAST:[Echeverria],FIRST:[Luis],PHONE:[(   )    -],FAX:[(   )    -],ADDRESS:[Cardiologist  [address] [place] [address]  Phone: [phone]]],FREE:[LAST:[Lamberto],FIRST:[Giovanna],PHONE:[(   )    -],FAX:[(   )    -],ADDRESS:[Endocrinologist  [address] [place] [address]  Phone: [phone]]] TOKEN:'[id number]',FREE:[LAST:[Echeverria],FIRST:[Luis],PHONE:[(   )    -],FAX:[(   )    -],ADDRESS:[Cardiologist  [address] [place] [address]  Phone: [phone]]],FREE:[LAST:[Lamberto],FIRST:[Giovanna],PHONE:[(   )    -],FAX:[(   )    -],ADDRESS:[Endocrinologist  [address] [place] [address]  Phone: [phone]]],TOKEN:'[id number]'

## 2017-12-11 ENCOUNTER — INPATIENT (INPATIENT)
Facility: HOSPITAL | Age: 82
LOS: 3 days | Discharge: HOME | DRG: 92 | End: 2017-12-15
Attending: SPECIALIST | Admitting: SPECIALIST
Payer: MEDICARE

## 2017-12-11 VITALS
SYSTOLIC BLOOD PRESSURE: 153 MMHG | TEMPERATURE: 99 F | HEART RATE: 58 BPM | OXYGEN SATURATION: 98 % | DIASTOLIC BLOOD PRESSURE: 71 MMHG | RESPIRATION RATE: 18 BRPM

## 2017-12-11 DIAGNOSIS — E11.9 TYPE 2 DIABETES MELLITUS WITHOUT COMPLICATIONS: ICD-10-CM

## 2017-12-11 DIAGNOSIS — R41.82 ALTERED MENTAL STATUS, UNSPECIFIED: ICD-10-CM

## 2017-12-11 DIAGNOSIS — T36.95XA ADVERSE EFFECT OF UNSPECIFIED SYSTEMIC ANTIBIOTIC, INITIAL ENCOUNTER: ICD-10-CM

## 2017-12-11 DIAGNOSIS — I10 ESSENTIAL (PRIMARY) HYPERTENSION: ICD-10-CM

## 2017-12-11 DIAGNOSIS — N17.9 ACUTE KIDNEY FAILURE, UNSPECIFIED: ICD-10-CM

## 2017-12-11 DIAGNOSIS — R29.810 FACIAL WEAKNESS: ICD-10-CM

## 2017-12-11 DIAGNOSIS — E78.00 PURE HYPERCHOLESTEROLEMIA, UNSPECIFIED: ICD-10-CM

## 2017-12-11 LAB
ALBUMIN SERPL ELPH-MCNC: 4.3 G/DL — SIGNIFICANT CHANGE UP (ref 3.3–5)
ALP SERPL-CCNC: 47 U/L — SIGNIFICANT CHANGE UP (ref 40–120)
ALT FLD-CCNC: 16 U/L RC — SIGNIFICANT CHANGE UP (ref 10–45)
ANION GAP SERPL CALC-SCNC: 14 MMOL/L — SIGNIFICANT CHANGE UP (ref 5–17)
APPEARANCE UR: ABNORMAL
APTT BLD: 33.8 SEC — SIGNIFICANT CHANGE UP (ref 27.5–37.4)
AST SERPL-CCNC: 19 U/L — SIGNIFICANT CHANGE UP (ref 10–40)
BACTERIA # UR AUTO: ABNORMAL /HPF
BASE EXCESS BLDV CALC-SCNC: 1.1 MMOL/L — SIGNIFICANT CHANGE UP (ref -2–2)
BASOPHILS # BLD AUTO: 0 K/UL — SIGNIFICANT CHANGE UP (ref 0–0.2)
BASOPHILS NFR BLD AUTO: 0.4 % — SIGNIFICANT CHANGE UP (ref 0–2)
BILIRUB SERPL-MCNC: 0.2 MG/DL — SIGNIFICANT CHANGE UP (ref 0.2–1.2)
BILIRUB UR-MCNC: NEGATIVE — SIGNIFICANT CHANGE UP
BUN SERPL-MCNC: 23 MG/DL — SIGNIFICANT CHANGE UP (ref 7–23)
CA-I SERPL-SCNC: 1.26 MMOL/L — SIGNIFICANT CHANGE UP (ref 1.12–1.3)
CALCIUM SERPL-MCNC: 10.1 MG/DL — SIGNIFICANT CHANGE UP (ref 8.4–10.5)
CHLORIDE BLDV-SCNC: 99 MMOL/L — SIGNIFICANT CHANGE UP (ref 96–108)
CHLORIDE SERPL-SCNC: 97 MMOL/L — SIGNIFICANT CHANGE UP (ref 96–108)
CK MB BLD-MCNC: 3.3 % — SIGNIFICANT CHANGE UP (ref 0–3.5)
CK MB CFR SERPL CALC: 4.3 NG/ML — HIGH (ref 0–3.8)
CK SERPL-CCNC: 129 U/L — SIGNIFICANT CHANGE UP (ref 25–170)
CO2 BLDV-SCNC: 28 MMOL/L — SIGNIFICANT CHANGE UP (ref 22–30)
CO2 SERPL-SCNC: 25 MMOL/L — SIGNIFICANT CHANGE UP (ref 22–31)
COLOR SPEC: SIGNIFICANT CHANGE UP
CREAT SERPL-MCNC: 1.25 MG/DL — SIGNIFICANT CHANGE UP (ref 0.5–1.3)
DIFF PNL FLD: ABNORMAL
EOSINOPHIL # BLD AUTO: 0.1 K/UL — SIGNIFICANT CHANGE UP (ref 0–0.5)
EOSINOPHIL NFR BLD AUTO: 0.7 % — SIGNIFICANT CHANGE UP (ref 0–6)
EPI CELLS # UR: SIGNIFICANT CHANGE UP /HPF
GAS PNL BLDV: 133 MMOL/L — LOW (ref 136–145)
GAS PNL BLDV: SIGNIFICANT CHANGE UP
GAS PNL BLDV: SIGNIFICANT CHANGE UP
GLUCOSE BLDC GLUCOMTR-MCNC: 124 MG/DL — HIGH (ref 70–99)
GLUCOSE BLDV-MCNC: 128 MG/DL — HIGH (ref 70–99)
GLUCOSE SERPL-MCNC: 129 MG/DL — HIGH (ref 70–99)
GLUCOSE UR QL: NEGATIVE — SIGNIFICANT CHANGE UP
HCO3 BLDV-SCNC: 27 MMOL/L — SIGNIFICANT CHANGE UP (ref 21–29)
HCT VFR BLD CALC: 38 % — SIGNIFICANT CHANGE UP (ref 34.5–45)
HCT VFR BLDA CALC: 40 % — SIGNIFICANT CHANGE UP (ref 39–50)
HGB BLD CALC-MCNC: 13.2 G/DL — SIGNIFICANT CHANGE UP (ref 11.5–15.5)
HGB BLD-MCNC: 13.2 G/DL — SIGNIFICANT CHANGE UP (ref 11.5–15.5)
HOROWITZ INDEX BLDV+IHG-RTO: SIGNIFICANT CHANGE UP
INR BLD: 1 RATIO — SIGNIFICANT CHANGE UP (ref 0.88–1.16)
KETONES UR-MCNC: ABNORMAL
LACTATE BLDV-MCNC: 2.4 MMOL/L — HIGH (ref 0.7–2)
LEUKOCYTE ESTERASE UR-ACNC: ABNORMAL
LYMPHOCYTES # BLD AUTO: 1.6 K/UL — SIGNIFICANT CHANGE UP (ref 1–3.3)
LYMPHOCYTES # BLD AUTO: 19 % — SIGNIFICANT CHANGE UP (ref 13–44)
MCHC RBC-ENTMCNC: 34.4 PG — HIGH (ref 27–34)
MCHC RBC-ENTMCNC: 34.6 GM/DL — SIGNIFICANT CHANGE UP (ref 32–36)
MCV RBC AUTO: 99.4 FL — SIGNIFICANT CHANGE UP (ref 80–100)
MONOCYTES # BLD AUTO: 0.8 K/UL — SIGNIFICANT CHANGE UP (ref 0–0.9)
MONOCYTES NFR BLD AUTO: 9.1 % — SIGNIFICANT CHANGE UP (ref 2–14)
NEUTROPHILS # BLD AUTO: 6.2 K/UL — SIGNIFICANT CHANGE UP (ref 1.8–7.4)
NEUTROPHILS NFR BLD AUTO: 70.9 % — SIGNIFICANT CHANGE UP (ref 43–77)
NITRITE UR-MCNC: NEGATIVE — SIGNIFICANT CHANGE UP
PCO2 BLDV: 50 MMHG — SIGNIFICANT CHANGE UP (ref 35–50)
PH BLDV: 7.35 — SIGNIFICANT CHANGE UP (ref 7.35–7.45)
PH UR: 6.5 — SIGNIFICANT CHANGE UP (ref 5–8)
PLATELET # BLD AUTO: 275 K/UL — SIGNIFICANT CHANGE UP (ref 150–400)
PO2 BLDV: 24 MMHG — LOW (ref 25–45)
POTASSIUM BLDV-SCNC: 5.9 MMOL/L — HIGH (ref 3.5–5)
POTASSIUM SERPL-MCNC: 4.7 MMOL/L — SIGNIFICANT CHANGE UP (ref 3.5–5.3)
POTASSIUM SERPL-SCNC: 4.7 MMOL/L — SIGNIFICANT CHANGE UP (ref 3.5–5.3)
PROT SERPL-MCNC: 7.5 G/DL — SIGNIFICANT CHANGE UP (ref 6–8.3)
PROT UR-MCNC: SIGNIFICANT CHANGE UP
PROTHROM AB SERPL-ACNC: 10.8 SEC — SIGNIFICANT CHANGE UP (ref 9.8–12.7)
RBC # BLD: 3.83 M/UL — SIGNIFICANT CHANGE UP (ref 3.8–5.2)
RBC # FLD: 12.3 % — SIGNIFICANT CHANGE UP (ref 10.3–14.5)
RBC CASTS # UR COMP ASSIST: ABNORMAL /HPF (ref 0–2)
SAO2 % BLDV: 33 % — LOW (ref 67–88)
SODIUM SERPL-SCNC: 136 MMOL/L — SIGNIFICANT CHANGE UP (ref 135–145)
SP GR SPEC: 1.02 — SIGNIFICANT CHANGE UP (ref 1.01–1.02)
TROPONIN T SERPL-MCNC: <0.01 NG/ML — SIGNIFICANT CHANGE UP (ref 0–0.06)
TROPONIN T SERPL-MCNC: <0.01 NG/ML — SIGNIFICANT CHANGE UP (ref 0–0.06)
UROBILINOGEN FLD QL: NEGATIVE — SIGNIFICANT CHANGE UP
WBC # BLD: 8.7 K/UL — SIGNIFICANT CHANGE UP (ref 3.8–10.5)
WBC # FLD AUTO: 8.7 K/UL — SIGNIFICANT CHANGE UP (ref 3.8–10.5)
WBC UR QL: >50 /HPF (ref 0–5)

## 2017-12-11 PROCEDURE — 99223 1ST HOSP IP/OBS HIGH 75: CPT

## 2017-12-11 PROCEDURE — 99291 CRITICAL CARE FIRST HOUR: CPT | Mod: 25,GC

## 2017-12-11 PROCEDURE — 71010: CPT | Mod: 26

## 2017-12-11 PROCEDURE — 93010 ELECTROCARDIOGRAM REPORT: CPT

## 2017-12-11 PROCEDURE — 70450 CT HEAD/BRAIN W/O DYE: CPT | Mod: 26

## 2017-12-11 RX ORDER — ESCITALOPRAM OXALATE 10 MG/1
5 TABLET, FILM COATED ORAL DAILY
Qty: 0 | Refills: 0 | Status: DISCONTINUED | OUTPATIENT
Start: 2017-12-11 | End: 2017-12-15

## 2017-12-11 RX ORDER — GLUCAGON INJECTION, SOLUTION 0.5 MG/.1ML
1 INJECTION, SOLUTION SUBCUTANEOUS ONCE
Qty: 0 | Refills: 0 | Status: DISCONTINUED | OUTPATIENT
Start: 2017-12-11 | End: 2017-12-15

## 2017-12-11 RX ORDER — INFLUENZA VIRUS VACCINE 15; 15; 15; 15 UG/.5ML; UG/.5ML; UG/.5ML; UG/.5ML
0.5 SUSPENSION INTRAMUSCULAR ONCE
Qty: 0 | Refills: 0 | Status: DISCONTINUED | OUTPATIENT
Start: 2017-12-11 | End: 2017-12-15

## 2017-12-11 RX ORDER — SODIUM CHLORIDE 9 MG/ML
1000 INJECTION, SOLUTION INTRAVENOUS
Qty: 0 | Refills: 0 | Status: DISCONTINUED | OUTPATIENT
Start: 2017-12-11 | End: 2017-12-15

## 2017-12-11 RX ORDER — AMLODIPINE BESYLATE 2.5 MG/1
5 TABLET ORAL DAILY
Qty: 0 | Refills: 0 | Status: DISCONTINUED | OUTPATIENT
Start: 2017-12-11 | End: 2017-12-15

## 2017-12-11 RX ORDER — SODIUM CHLORIDE 9 MG/ML
1000 INJECTION INTRAMUSCULAR; INTRAVENOUS; SUBCUTANEOUS
Qty: 0 | Refills: 0 | Status: COMPLETED | OUTPATIENT
Start: 2017-12-11 | End: 2017-12-11

## 2017-12-11 RX ORDER — CHOLECALCIFEROL (VITAMIN D3) 125 MCG
2000 CAPSULE ORAL DAILY
Qty: 0 | Refills: 0 | Status: DISCONTINUED | OUTPATIENT
Start: 2017-12-11 | End: 2017-12-15

## 2017-12-11 RX ORDER — ESCITALOPRAM OXALATE 10 MG/1
1 TABLET, FILM COATED ORAL
Qty: 0 | Refills: 0 | COMMUNITY

## 2017-12-11 RX ORDER — METOPROLOL TARTRATE 50 MG
25 TABLET ORAL DAILY
Qty: 0 | Refills: 0 | Status: DISCONTINUED | OUTPATIENT
Start: 2017-12-11 | End: 2017-12-15

## 2017-12-11 RX ORDER — VITAMIN E 100 UNIT
400 CAPSULE ORAL DAILY
Qty: 0 | Refills: 0 | Status: DISCONTINUED | OUTPATIENT
Start: 2017-12-11 | End: 2017-12-15

## 2017-12-11 RX ORDER — METOPROLOL TARTRATE 50 MG
1 TABLET ORAL
Qty: 0 | Refills: 0 | COMMUNITY

## 2017-12-11 RX ORDER — DEXTROSE 50 % IN WATER 50 %
25 SYRINGE (ML) INTRAVENOUS ONCE
Qty: 0 | Refills: 0 | Status: DISCONTINUED | OUTPATIENT
Start: 2017-12-11 | End: 2017-12-15

## 2017-12-11 RX ORDER — INSULIN LISPRO 100/ML
VIAL (ML) SUBCUTANEOUS
Qty: 0 | Refills: 0 | Status: DISCONTINUED | OUTPATIENT
Start: 2017-12-11 | End: 2017-12-15

## 2017-12-11 RX ORDER — DEXTROSE 50 % IN WATER 50 %
1 SYRINGE (ML) INTRAVENOUS ONCE
Qty: 0 | Refills: 0 | Status: DISCONTINUED | OUTPATIENT
Start: 2017-12-11 | End: 2017-12-15

## 2017-12-11 RX ADMIN — SODIUM CHLORIDE 80 MILLILITER(S): 9 INJECTION INTRAMUSCULAR; INTRAVENOUS; SUBCUTANEOUS at 22:44

## 2017-12-11 NOTE — ED PROVIDER NOTE - RESPIRATORY, MLM
Breath sounds clear and equal bilaterally. **ATTENDING ADDENDUM (Dr. Everardo Baeza): NO wheezing, rales, rhonchi, crackles, stridor, drooling, retractions, nasal flaring, or tripoding.

## 2017-12-11 NOTE — ED PROVIDER NOTE - CRITICAL CARE INDICATION, MLM
patient was critically ill... Patient was critically ill with a high probability of imminent or life threatening deterioration. CODE STROKE initiated at time of patient's initial presentation.

## 2017-12-11 NOTE — ED PROVIDER NOTE - MUSCULOSKELETAL, MLM
Spine appears normal, range of motion is not limited, no muscle or joint tenderness **ATTENDING ADDENDUM (Dr. Everardo Baeza): strength appears symmetrically equal bilaterally in the upper and lower extremities 5/5. NO obvious numbness, tingling, weakness, or paresthesias.

## 2017-12-11 NOTE — H&P ADULT - HISTORY OF PRESENT ILLNESS
89f pmh dm2 htn hl p/w ams. Pt speaks Toishanese (dialect more similar to cantonese than mandarin), interpretation provided by daughters at bedside. Pt was referred to an obgyn for evaluation of vaginal bleeding and had a UA done that showed uti so pt was placed on bactrim 800/160mg bid on 12/9. Since then, daughters state pt has had more visual hallucinations, not slept very well and had gait instability, leaning to the right. Pt presented to the ED and a code stroke was called, CTH showed age appropriate involutional and ischemic gliotic changes, no hemorrhage.

## 2017-12-11 NOTE — ED PROVIDER NOTE - PROGRESS NOTE DETAILS
No MRI as per neurology consult for now.  - Saravanan Chaudhari MD No MRI as per neurology consult for now. Will hold off antibiotics for now to evaluate if symptoms are related to antibiotic administration vs resistance of infection (e.g. UTI causing symptoms), will wait for culture.  - Saravanan Chaudhari MD No MRI as per neurology consult for now. Will hold off antibiotics for now to evaluate if symptoms are related to antibiotic administration vs resistance of infection (e.g. UTI causing symptoms), will wait for culture.  - Saravanan Chaudhari MD  **ATTENDING ADDENDUM (Dr. Everardo Baeza): agree with above notation. Patient serially evaluated throughout ED course. NO acute deterioration up to this time in the ED. ED diagnostics up to this time acknowledged and noted. NO evidence of intracerebral hemorrhage at this time. Manifestation possibly related to recent antibiotics use? Anticipatory guidance provided. Agree with admission for diagnostic and therapeutic intensity. Will continue to observe and monitor closely until definitive disposition and placement are made.

## 2017-12-11 NOTE — ED PROVIDER NOTE - PHYSICAL EXAMINATION
PE: CONSTITUTIONAL: Nontoxic, in no apparent distress. ENMT: Airway patent, nasal mucosa clear, mouth with normal mucosa, questionable Right lower lip swelling. HEAD: NCAT EYES: PERRL, EOMI bilaterally CARDIAC: RRR, no m/r/g, no pedal edema RESPIRATORY: CTA bilaterally, no adventitious sounds GI: Abdomen non-distended, non-tender MSK: Spine appears normal, range of motion is not limited, no muscle/joint tenderness NEURO: +Right lower facial droop vs lip swelling, CNII-XII grossly intact, 5/5 strength, full sensation all extremities, gait intact SKIN: Skin tone normal in color, warm and dry. No evidence of rash. PE: CONSTITUTIONAL: Nontoxic, in no apparent distress. ENMT: Airway patent, nasal mucosa clear, mouth with normal mucosa, questionable Right lower lip swelling. HEAD: NCAT EYES: PERRL, EOMI bilaterally CARDIAC: RRR, no m/r/g, no pedal edema RESPIRATORY: CTA bilaterally, no adventitious sounds GI: Abdomen non-distended, non-tender MSK: Spine appears normal, range of motion is not limited, no muscle/joint tenderness NEURO: +Right lower facial droop vs lip swelling, CNII-XII grossly intact, 5/5 strength, full sensation all extremities, gait intact SKIN: Skin tone normal in color, warm and dry. No evidence of rash.  **ATTENDING ADDENDUM (Dr. Everardo Baeza): I have reviewed and substantially contributed to the elements of the PE as documented above. I have directly performed an examination of this patient in conjunction with the other members (EM resident/PA/NP) of the patient care team.

## 2017-12-11 NOTE — ED ADULT NURSE NOTE - CHIEF COMPLAINT QUOTE
AMS x 3 days, R sided facial asymmetry 30 minutes ago  no drift, no one sided weakness, ambulatory with walker

## 2017-12-11 NOTE — ED PROVIDER NOTE - CHPI ED SYMPTOMS POS
CONFUSION/**ATTENDING ADDENDUM (Dr. Everardo Baeza): POSITIVE facial droop **ATTENDING ADDENDUM (Dr. Everarod Baeza): POSITIVE facial droop, question of speech change, question of altered mental status/CONFUSION

## 2017-12-11 NOTE — ED ADULT NURSE NOTE - OBJECTIVE STATEMENT
89 yo female presents to the ED from PCP office c/o right facial droop, slurred speech and unsteady gait. family states 2 days ago patient was placed on antibiotic for UTI. they state since she started medication patient started to hallucinate and had unsteady gait. they state patient was taken to PCP and PCP noticed right facial droop with slurred speech and wanted patient to come to ED. patient is AAOx2, baseline as per family, disorientated to time. PERRL, 3mm pupil bilaterally. right facial droop present, family states "more droop then usual." slurred speech noted as per family. patient upper and lower extremities equal in strength with positive sensation bilaterally. gait is unsteady. FS= 124 in ED. GCS=15. NIHSS= 0. patient lung sounds clear bilaterally. cap refill <3sec. patient denies chest pain, SOB, fevers, chills, N/V/D, HA, dizziness, cough, abdominal pain, back pain. VSS. MD at the bedside.

## 2017-12-11 NOTE — ED PROVIDER NOTE - GASTROINTESTINAL, MLM
Abdomen soft, non-tender **ATTENDING ADDENDUM (Dr. Everardo Baeza): NO guarding, rebound, or rigidity. NO pulsatile or non-pulsatile masses. NO hernias. NO obvious hepatosplenomegaly.

## 2017-12-11 NOTE — H&P ADULT - ASSESSMENT
89f pmh dm2 htn hl p/w ams. Pt speaks Toishanese (dialect more similar to cantonese than mandarin), interpretation provided by daughters at bedside. Pt was referred to an obgyn for evaluation of vaginal bleeding and had a UA done that showed uti so pt was placed on bactrim 800/160mg bid on 12/9. Since then, daughters state pt has had more visual hallucinations, not slept very well and had gait instability, leaning to the right. Pt presented to the ED and a code stroke was called, CTH showed age appropriate involutional and ischemic gliotic changes, no hemorrhage

## 2017-12-11 NOTE — ED PROVIDER NOTE - ATTENDING CONTRIBUTION TO CARE
**ATTENDING ADDENDUM (Dr. Everardo Baeza): I attest that I have directly examined this patient and reviewed and formulated the diagnostic and therapeutic management plan in collaboration with the EM resident. Please see MDM note and remainder of EMR for findings from CC, HPI, ROS, and PE. (Chaudhari)

## 2017-12-11 NOTE — ED ADULT NURSE REASSESSMENT NOTE - NS ED NURSE REASSESS COMMENT FT1
speech is slurred as per family. PERRL. 3mm pupils bilaterally. upper and lower extremities equal in strength and sensation bilaterally. gait unsteady.

## 2017-12-11 NOTE — ED PROVIDER NOTE - ENMT, MLM
Airway patent, Nasal mucosa clear. Mouth with normal mucosa. Throat has no vesicles, no oropharyngeal exudates and uvula is midline. **ATTENDING ADDENDUM (Dr. Everardo Baeza): right sided lower mouth droop (mild/minimal) without droop in other areas of face. Possible lower lip soft-tissue swelling?

## 2017-12-11 NOTE — CONSULT NOTE ADULT - SUBJECTIVE AND OBJECTIVE BOX
Neurology Consult    Name  TRACY SNEED    89 year old right-handed  female with DMII, HTN, HLD not on Aspirin presents with unsteady gait and generalized weakness since Saturday after being diagnosed with URI and started on Bactrim the day before                                                           MEDICATIONS  (STANDING):    MEDICATIONS  (PRN):      Allergies    Januvia (Other)  Metanx (Other)  sulfamethizole (Other)    Intolerances        Objective  Vital Signs Last 24 Hrs  T(C): 37.2 (11 Dec 2017 15:47), Max: 37.2 (11 Dec 2017 15:47)  T(F): 99 (11 Dec 2017 15:47), Max: 99 (11 Dec 2017 15:47)  HR: 58 (11 Dec 2017 15:47) (58 - 58)  BP: 153/71 (11 Dec 2017 15:47) (153/71 - 153/71)  BP(mean): --  RR: 18 (11 Dec 2017 15:47) (18 - 18)  SpO2: 98% (11 Dec 2017 15:47) (98% - 98%)    General Exam   General appearance: No acute distress, well-nourished  Respiratory:    non-labored respirations               Neurological Exam  Mental Status:  alert and oriented x3, fluent speech, following commands, repetition and naming intact    Cranial Nerves: PERRL, EOMI without nystagmus, visual fields intact no facial droop, no dysarthria    Motor:   Tone:   normal               Strength:  Upper extremity                          Delt       Bicep    Tricep                                                  R         5/5        5/5        5/5       5/5                                               L          5/5        5/5        5/5      5/5    Lower extremity                           HF          KE          KF        DF         PF                                               R        5/5        5/5        5/5       5/5       5/5                                               L         5/5        5/5        5/5      5/5        5/5    Pronator drift:   none           Dysmetria: none with finger-to-nose testing  Tremor:  none appreciated at rest or in action    Sensation: intact grossly to light touch    Deep Tendon Reflexes:   Toes flexor bilaterally ________    Gait:     Other Studies                    Radiology    CTh/CTA:  MRI/MRA:  TTE:  EEG: Neurology Consult    Name  TRACY SNEED    89 year old right-handed  female with DMII, HTN, HLD not on Aspirin presents with unsteady gait and generalized weakness since Saturday after being diagnosed with URI and started on Bactrim the day before. Patient has not been herself since then and has been experiencing hallucination and lack of sleep. Earlier today, the family noticed the patient had facial asymmetry involving the right lower face. Patient was evaluated at PCP earlier today who suggested the patient be sent in to evaluate her AMS and unsteady gait.   Of note, the family states that the patient is currently being worked up for vaginal bleeding possibly related to gynecologic malignancy.     Social history: remote smoking many years ago     PAST MEDICAL & SURGICAL HISTORY:  Back pain  Diverticulitis  Osteoporosis  Hypercholesteremia  HTN (Hypertension)  Diabetes Mellitus Type II  After Cataract, Bilateral   Section                                                         MEDICATIONS  (STANDING):    MEDICATIONS  (PRN):      Allergies    Januvia (Other)  Metanx (Other)  sulfamethizole (Other)    Intolerances        Objective  Vital Signs Last 24 Hrs  T(C): 37.2 (11 Dec 2017 15:47), Max: 37.2 (11 Dec 2017 15:47)  T(F): 99 (11 Dec 2017 15:47), Max: 99 (11 Dec 2017 15:47)  HR: 58 (11 Dec 2017 15:47) (58 - 58)  BP: 153/71 (11 Dec 2017 15:47) (153/71 - 153/71)  BP(mean): --  RR: 18 (11 Dec 2017 15:47) (18 - 18)  SpO2: 98% (11 Dec 2017 15:47) (98% - 98%)    General Exam   General appearance: No acute distress, well-nourished  Respiratory:    non-labored respirations               Neurological Exam  Mental Status:  alert and oriented x3, fluent speech, following commands, repetition and naming intact    Cranial Nerves: PERRL, EOMI without nystagmus, visual fields intact no facial droop, no dysarthria    Motor:   Tone:   normal               Strength:  Upper extremity                          Delt       Bicep    Tricep                                                  R         5/5        5/5        5/5       5/5                                               L          5/5        5/5        5/5      5/5    Lower extremity                           HF          KE          KF        DF         PF                                               R        5/5        5/5        5/5       5/5       5/5                                               L         5/5        5/5        5/5      5/5        5/5    Pronator drift:   none           Dysmetria: none with finger-to-nose testing  Tremor:  none appreciated at rest or in action    Sensation: intact grossly to light touch    Deep Tendon Reflexes:   Toes flexor bilaterally ________    Gait:     Other Studies                          13.2   8.7   )-----------( 275      ( 11 Dec 2017 16:05 )             38.0     12-11    136  |  97  |  23  ----------------------------<  129<H>  4.7   |  25  |  1.25    Ca    10.1      11 Dec 2017 16:05    TPro  7.5  /  Alb  4.3  /  TBili  0.2  /  DBili  x   /  AST  19  /  ALT  16  /  AlkPhos  47  12-11        Radiology    CTh: no acute hemorrhage or signs of infarct Neurology Consult    Name  TRACY SNEED    89 year old right-handed  female with DMII, HTN, HLD not on Aspirin presents with unsteady gait and generalized weakness since Saturday after being diagnosed with URI and started on Bactrim the day before. Patient has not been herself since then and has been experiencing hallucination and lack of sleep. Earlier today, the family noticed the patient had facial asymmetry involving the right lower face. Patient was evaluated at PCP earlier today who suggested the patient be sent in to evaluate her AMS and unsteady gait.   Of note, the family states that the patient is currently being worked up for vaginal bleeding possibly related to gynecologic malignancy.   At baseline the patient ambulates with rollating walker and is typically oriented to self and place, but not date/year.   When questioned further, family and patient feel that her right lower lip is swollen contributing to her asymmetric face at baseline.     NIHSS 1, MRS 3    Social history: remote smoking many years ago     PAST MEDICAL & SURGICAL HISTORY:  Back pain  Diverticulitis  Osteoporosis  Hypercholesteremia  HTN (Hypertension)  Diabetes Mellitus Type II  After Cataract, Bilateral   Section                                                         MEDICATIONS  (STANDING):    MEDICATIONS  (PRN):      Allergies    Januvia (Other)  Metanx (Other)  sulfamethizole (Other)    Intolerances        Objective  Vital Signs Last 24 Hrs  T(C): 37.2 (11 Dec 2017 15:47), Max: 37.2 (11 Dec 2017 15:47)  T(F): 99 (11 Dec 2017 15:47), Max: 99 (11 Dec 2017 15:47)  HR: 58 (11 Dec 2017 15:47) (58 - 58)  BP: 153/71 (11 Dec 2017 15:47) (153/71 - 153/71)  BP(mean): --  RR: 18 (11 Dec 2017 15:47) (18 - 18)  SpO2: 98% (11 Dec 2017 15:47) (98% - 98%)    General Exam   General appearance: No acute distress, well-nourished  Respiratory:    non-labored respirations               Neurological Exam  Mental Status:  alert and oriented x3, fluent speech, following commands, repetition and naming intact    Cranial Nerves: PERRL, EOMI without nystagmus, visual fields intact, right-sided facial droop with some swelling, no dysarthria    Motor:   Tone:   normal               Strength:  Upper extremity                          Delt       Bicep    Tricep                                                  R         5/5        5/5        5/5       5/5                                               L              Lower extremity                           HF          KE              DF         PF                                               R                                                       L             Pronator drift:   none           Dysmetria: none with finger-to-nose testing and heel-to-shin  Tremor:  none appreciated at rest or in action    Sensation: intact grossly to light touch    Deep Tendon Reflexes: ankles and patellar 0 (limited exam), 1+ upper extremities  Toes flexor bilaterally down    Gait: stable with walker    Other Studies                          13.2   8.7   )-----------( 275      ( 11 Dec 2017 16:05 )             38.0     12-    136  |  97  |  23  ----------------------------<  129<H>  4.7   |  25  |  1.25    Ca    10.1      11 Dec 2017 16:05    TPro  7.5  /  Alb  4.3  /  TBili  0.2  /  DBili  x   /  AST  19  /  ALT  16  /  AlkPhos  47  12-11        Radiology    CTh: no acute hemorrhage or signs of infarct

## 2017-12-11 NOTE — ED ADULT TRIAGE NOTE - CHIEF COMPLAINT QUOTE
AMS x 3 days, R sided facial asymmetry 30 minutes ago AMS x 3 days, R sided facial asymmetry 30 minutes ago  no drift, no one sided weakness, ambulatory with walker

## 2017-12-11 NOTE — ED PROVIDER NOTE - CONDUCTED A DETAILED DISCUSSION WITH PATIENT AND/OR GUARDIAN REGARDING, MDM
need to admit/lab results/**ATTENDING ADDENDUM (Dr. Everardo Baeza): Anticipatory guidance provided./radiology results

## 2017-12-11 NOTE — ED PROVIDER NOTE - PLAN OF CARE
ATTENDING ADDENDUM (Dr. Everardo Baeza): Goals of care include resolution of emergent/urgent symptoms and concerns, and restoration to baseline level of homeostasis.

## 2017-12-11 NOTE — ED PROVIDER NOTE - CRITICAL CARE PROVIDED
**ATTENDING ADDENDUM (Dr. Everardo Baeza): Anticipatory guidance provided./direct patient care (not related to procedure)/additional history taking/consultation with other physicians/interpretation of diagnostic studies/consult w/ pt's family directly relating to pts condition/documentation

## 2017-12-11 NOTE — ED PROVIDER NOTE - MEDICAL DECISION MAKING DETAILS
**ATTENDING MEDICAL DECISION MAKING/SYNTHESIS (Dr. Everardo Baeza): I have reviewed the Chief Complaint, the HPI, the ROS, and have directly performed and confirmed the findings on the Physical Examination. I have reviewed the medical decision making with all providers, as applicable. The PROBLEM REPRESENTATION at this time is: 89-year-old woman with recent upper respiratory tract infection (on antibiotics) presenting with acute right-sided facial droop and unsteady gait (noted at office of her primary care physician/provider). Referred Here for evaluation. The MOST LIKELY DIAGNOSIS, and the LIST OF DIFFERENTIAL DIAGNOSES, includes (but is not limited to) the following: ischemic cerebrovascular accident v. transient ischemic attack, intracerebral hemorrhage, electrolyte-metabolic-endocrine derangements, dehydration,   The likelihood of each of these diagnoses has been appropriately considered in the context of this patient's presentation and my evaluation. PLAN: as described in EMR, including diagnostics, therapeutics and consultation as clinically warranted. I will continue to reevaluate the patient, including the results of all testing, and monitor response to therapy throughout the patient's course in the ED. **ATTENDING MEDICAL DECISION MAKING/SYNTHESIS (Dr. Everardo Baeza): I have reviewed the Chief Complaint, the HPI, the ROS, and have directly performed and confirmed the findings on the Physical Examination. I have reviewed the medical decision making with all providers, as applicable. The PROBLEM REPRESENTATION at this time is: 89-year-old woman with recent upper respiratory tract infection (on antibiotics) presenting with acute right-sided facial droop and unsteady gait (noted at office of her primary care physician/provider). Referred Here for evaluation. The MOST LIKELY DIAGNOSIS, and the LIST OF DIFFERENTIAL DIAGNOSES, includes (but is not limited to) the following: ischemic cerebrovascular accident v. transient ischemic attack, intracerebral hemorrhage, electrolyte-metabolic-endocrine derangements, dehydration, adverse drug reaction, syncope, near-syncope, hypoglycemia, seizure, other neurologic manifestion of occult condition NOS, dementia (unlikely given presentation and clinical findings), delirium (unlikely given presentation and clinical findings), psychogenic (unlikely given presentation and clinical findings). The likelihood of each of these diagnoses has been appropriately considered in the context of this patient's presentation and my evaluation. PLAN: as described in EMR, including diagnostics, therapeutics and consultation as clinically warranted. I will continue to reevaluate the patient, including the results of all testing, and monitor response to therapy throughout the patient's course in the ED.

## 2017-12-11 NOTE — CONSULT NOTE ADULT - ASSESSMENT
89 year old RH  female with DMII, HTN, HLD presents with unsteady gait, AMS since Saturday with new facial droop in the setting of treatment with Bactrim for acute URI with negative CT head and neurologic exam with right lower facial droop. 89 year old RH  female with DMII, HTN, HLD presents with unsteady gait, AMS since Saturday with new facial droop in the setting of treatment with Bactrim for acute URI with negative CT head and neurologic exam with right lower facial droop that appears to be chronic/worsening due to swelling that developed promptly after starting Bactrim.   CVA unlikely in setting of no neurologic deficits on exam. More likely that AMS/hallucinations are secondary to bactrim/URI.

## 2017-12-11 NOTE — H&P ADULT - NSHPLABSRESULTS_GEN_ALL_CORE
Vital Signs Last 24 Hrs  T(C): 36.7 (11 Dec 2017 18:23), Max: 37.2 (11 Dec 2017 15:47)  T(F): 98.1 (11 Dec 2017 18:23), Max: 99 (11 Dec 2017 15:47)  HR: 61 (11 Dec 2017 18:23) (57 - 88)  BP: 155/76 (11 Dec 2017 18:23) (144/66 - 164/76)  BP(mean): --  RR: 18 (11 Dec 2017 18:23) (17 - 18)  SpO2: 95% (11 Dec 2017 18:23) (95% - 98%)  I&O's Summary    CAPILLARY BLOOD GLUCOSE                                13.2   8.7   )-----------( 275      ( 11 Dec 2017 16:05 )             38.0     12-11    136  |  97  |  23  ----------------------------<  129<H>  4.7   |  25  |  1.25    Ca    10.1      11 Dec 2017 16:05    TPro  7.5  /  Alb  4.3  /  TBili  0.2  /  DBili  x   /  AST  19  /  ALT  16  /  AlkPhos  47  12-11    CARDIAC MARKERS ( 11 Dec 2017 16:05 )  x     / <0.01 ng/mL / x     / x     / x          LIVER FUNCTIONS - ( 11 Dec 2017 16:05 )  Alb: 4.3 g/dL / Pro: 7.5 g/dL / ALK PHOS: 47 U/L / ALT: 16 U/L RC / AST: 19 U/L / GGT: x           PT/INR - ( 11 Dec 2017 16:05 )   PT: 10.8 sec;   INR: 1.00 ratio         PTT - ( 11 Dec 2017 16:05 )  PTT:33.8 sec      Labs/imaging personally reviewed: cth neg for bleeding

## 2017-12-11 NOTE — ED PROVIDER NOTE - CONSTITUTIONAL MANNER
**ATTENDING ADDENDUM (Dr. Everardo Baeza): able to follow commands with **ATTENDING ADDENDUM (Dr. Everardo Baeza): family provided translation services and patient/family did not request, need, or want additional translation services at time of initial evaluation by family members.

## 2017-12-11 NOTE — ED PROVIDER NOTE - UNABLE TO OBTAIN
**ATTENDING ADDENDUM (Dr. Everardo Baeza): CODE STROKE activated at time of patient's presentation to the ED as per protocol. Urgent need for Intervention

## 2017-12-11 NOTE — ED PROVIDER NOTE - EYES, MLM
**ATTENDING ADDENDUM (Dr. Everardo Baeza): Extraocular muscle movements intact. Clear corneas bilaterally, pupils equal and round. NO nystagmus.

## 2017-12-11 NOTE — ED PROVIDER NOTE - CARE PLAN
Principal Discharge DX:	Antibiotic reaction, initial encounter Principal Discharge DX:	Antibiotic reaction, initial encounter  Goal:	ATTENDING ADDENDUM (Dr. Everardo Baeza): Goals of care include resolution of emergent/urgent symptoms and concerns, and restoration to baseline level of homeostasis.

## 2017-12-11 NOTE — H&P ADULT - NSHPPHYSICALEXAM_GEN_ALL_CORE
PHYSICAL EXAM:  GENERAL: NAD, well-developed  HEAD:  Atraumatic, Normocephalic  EYES: EOMI, PERRLA, conjunctiva and sclera clear  ENMT: Supple, No JVD  RESPIRATORY: Clear to auscultation bilaterally; No wheeze  CARDIOVASCULAR: Regular rate and rhythm; No murmurs, rubs, or gallops  GI: Soft, Nontender, Nondistended; Bowel sounds present  EXTREMITIES:  2+ Peripheral Pulses, No clubbing, cyanosis, or edema  PSYCH: AAOx2-3, believes medical staff are dancers  NEUROLOGY: R face mild droop  SKIN: No rashes or lesions

## 2017-12-11 NOTE — ED PROVIDER NOTE - OBJECTIVE STATEMENT
89y Female complaining of altered mental status. Having generalized weakness, unsteady gait. and hallucinations in the setting of taking Bactrim for the past 3.5 days, symptoms started the day after taking antibiotics. Saw Dr. Beverly Fontana today, started to notice right facial droop and slurred speech during the visit, came for evaluation. 89y Female complaining of altered mental status. Having generalized weakness, unsteady gait. and hallucinations in the setting of taking Bactrim for the past 3.5 days, symptoms started the day after taking antibiotics. Saw Dr. Beverly Fontana today, started to notice right facial droop and slurred speech during the visit, came for evaluation.  **ATTENDING ADDENDUM (Dr. Everardo Baeza): I attest that I have directly and personally interviewed and examined this patient and elicited a comparable history of present illness and review of systems as documented, along with my EM resident. I attest that I have made significant contributions to the documentation where necessary and as noted in the EMR. Of note, and in addition to the above, patient is a 89-year-old woman presenting with family s/p referral from primary care physician/provider's office with right-sided facial droop and unsteady gait. Family also reports some confusion and slurred speech, following the administration of antibiotics (see above) for recent upper respiratory tract infection. NO obvious falls or trauma. NO fevers or chills. NO other focal or generalized motor weakness or sensory changes in the arms or legs. Here for evaluation. VAS 2/10. 89y Female PMH DM2, HTN, HLD complaining of altered mental status. Having generalized weakness, unsteady gait. and hallucinations in the setting of taking Bactrim for the past 3.5 days, symptoms started the day after taking antibiotics, likely for UTI. Saw PCP Dr. Beverly Fontana today, started to notice right facial droop and slurred speech during the visit, came for evaluation.  **ATTENDING ADDENDUM (Dr. Everardo Baeza): I attest that I have directly and personally interviewed and examined this patient and elicited a comparable history of present illness and review of systems as documented, along with my EM resident. I attest that I have made significant contributions to the documentation where necessary and as noted in the EMR. Of note, and in addition to the above, patient is a 89-year-old woman presenting with family s/p referral from primary care physician/provider's office with right-sided facial droop and unsteady gait. Family also reports some confusion and slurred speech, following the administration of antibiotics (see above) for recent upper respiratory tract infection. NO obvious falls or trauma. NO fevers or chills. NO other focal or generalized motor weakness or sensory changes in the arms or legs. Here for evaluation. VAS 2/10.

## 2017-12-11 NOTE — H&P ADULT - PROBLEM SELECTOR PLAN 1
-presumably toxic metabolic encephalopathy given recent bactrim administration  -hold bactrim for now, can resend ua/ucx to eval infectious etiology   -CTH without overt bleed, t/c MRI brain if does not improve, f/u neurology

## 2017-12-11 NOTE — ED PROVIDER NOTE - CHIEF COMPLAINT
The patient is a 89y Female complaining of altered mental status. The patient is a 89y Female with family with altered mental status and question of facial droop.

## 2017-12-11 NOTE — H&P ADULT - NSHPREVIEWOFSYSTEMS_GEN_ALL_CORE
Review of Systems:   CONSTITUTIONAL: No fever, chills, weight loss, or fatigue  EYES: No eye pain, visual disturbances, or discharge  ENMT:  No difficulty hearing, tinnitus, vertigo; No sinus or throat pain  NECK: No pain or stiffness  RESPIRATORY: No cough, wheezing, or shortness of breath  CARDIOVASCULAR: No chest pain, palpitations, dizziness, or leg swelling  GASTROINTESTINAL: No abdominal or epigastric pain. No nausea, vomiting, diarrhea. No melena or hematochezia.  GENITOURINARY: No dysuria, no hematuria  NEUROLOGICAL: No changes in strength/sensation   SKIN: No itching, burning, rashes, or lesions   LYMPH NODES: No enlarged glands  ENDOCRINE: No heat or cold intolerance; No hair loss  MUSCULOSKELETAL: No joint pain or swelling; No muscle, back, or extremity pain  PSYCHIATRIC: No depression, anxiety, mood swings  HEME/LYMPH: No easy bruising, or bleeding gums  ALLERGY AND IMMUNOLOGIC: No hives or eczema

## 2017-12-12 DIAGNOSIS — T36.95XA ADVERSE EFFECT OF UNSPECIFIED SYSTEMIC ANTIBIOTIC, INITIAL ENCOUNTER: ICD-10-CM

## 2017-12-12 LAB
ANION GAP SERPL CALC-SCNC: 13 MMOL/L — SIGNIFICANT CHANGE UP (ref 5–17)
ANION GAP SERPL CALC-SCNC: 15 MMOL/L — SIGNIFICANT CHANGE UP (ref 5–17)
BUN SERPL-MCNC: 19 MG/DL — SIGNIFICANT CHANGE UP (ref 7–23)
BUN SERPL-MCNC: 22 MG/DL — SIGNIFICANT CHANGE UP (ref 7–23)
CALCIUM SERPL-MCNC: 9.5 MG/DL — SIGNIFICANT CHANGE UP (ref 8.4–10.5)
CALCIUM SERPL-MCNC: 9.7 MG/DL — SIGNIFICANT CHANGE UP (ref 8.4–10.5)
CHLORIDE SERPL-SCNC: 97 MMOL/L — SIGNIFICANT CHANGE UP (ref 96–108)
CHLORIDE SERPL-SCNC: 97 MMOL/L — SIGNIFICANT CHANGE UP (ref 96–108)
CO2 SERPL-SCNC: 24 MMOL/L — SIGNIFICANT CHANGE UP (ref 22–31)
CO2 SERPL-SCNC: 25 MMOL/L — SIGNIFICANT CHANGE UP (ref 22–31)
CREAT SERPL-MCNC: 1.2 MG/DL — SIGNIFICANT CHANGE UP (ref 0.5–1.3)
CREAT SERPL-MCNC: 1.58 MG/DL — HIGH (ref 0.5–1.3)
GLUCOSE BLDC GLUCOMTR-MCNC: 123 MG/DL — HIGH (ref 70–99)
GLUCOSE BLDC GLUCOMTR-MCNC: 141 MG/DL — HIGH (ref 70–99)
GLUCOSE BLDC GLUCOMTR-MCNC: 174 MG/DL — HIGH (ref 70–99)
GLUCOSE BLDC GLUCOMTR-MCNC: 194 MG/DL — HIGH (ref 70–99)
GLUCOSE SERPL-MCNC: 121 MG/DL — HIGH (ref 70–99)
GLUCOSE SERPL-MCNC: 203 MG/DL — HIGH (ref 70–99)
HBA1C BLD-MCNC: 6.3 % — HIGH (ref 4–5.6)
HCT VFR BLD CALC: 34.3 % — LOW (ref 34.5–45)
HGB BLD-MCNC: 11.6 G/DL — SIGNIFICANT CHANGE UP (ref 11.5–15.5)
MCHC RBC-ENTMCNC: 32.1 PG — SIGNIFICANT CHANGE UP (ref 27–34)
MCHC RBC-ENTMCNC: 33.8 GM/DL — SIGNIFICANT CHANGE UP (ref 32–36)
MCV RBC AUTO: 95 FL — SIGNIFICANT CHANGE UP (ref 80–100)
PLATELET # BLD AUTO: 247 K/UL — SIGNIFICANT CHANGE UP (ref 150–400)
POTASSIUM SERPL-MCNC: 4.5 MMOL/L — SIGNIFICANT CHANGE UP (ref 3.5–5.3)
POTASSIUM SERPL-MCNC: 4.5 MMOL/L — SIGNIFICANT CHANGE UP (ref 3.5–5.3)
POTASSIUM SERPL-SCNC: 4.5 MMOL/L — SIGNIFICANT CHANGE UP (ref 3.5–5.3)
POTASSIUM SERPL-SCNC: 4.5 MMOL/L — SIGNIFICANT CHANGE UP (ref 3.5–5.3)
RBC # BLD: 3.61 M/UL — LOW (ref 3.8–5.2)
RBC # FLD: 13.3 % — SIGNIFICANT CHANGE UP (ref 10.3–14.5)
SODIUM SERPL-SCNC: 135 MMOL/L — SIGNIFICANT CHANGE UP (ref 135–145)
SODIUM SERPL-SCNC: 136 MMOL/L — SIGNIFICANT CHANGE UP (ref 135–145)
WBC # BLD: 7.44 K/UL — SIGNIFICANT CHANGE UP (ref 3.8–10.5)
WBC # FLD AUTO: 7.44 K/UL — SIGNIFICANT CHANGE UP (ref 3.8–10.5)

## 2017-12-12 PROCEDURE — 99222 1ST HOSP IP/OBS MODERATE 55: CPT

## 2017-12-12 RX ORDER — SODIUM CHLORIDE 9 MG/ML
1000 INJECTION INTRAMUSCULAR; INTRAVENOUS; SUBCUTANEOUS
Qty: 0 | Refills: 0 | Status: DISCONTINUED | OUTPATIENT
Start: 2017-12-12 | End: 2017-12-13

## 2017-12-12 RX ORDER — INSULIN LISPRO 100/ML
VIAL (ML) SUBCUTANEOUS AT BEDTIME
Qty: 0 | Refills: 0 | Status: DISCONTINUED | OUTPATIENT
Start: 2017-12-12 | End: 2017-12-15

## 2017-12-12 RX ORDER — HEPARIN SODIUM 5000 [USP'U]/ML
5000 INJECTION INTRAVENOUS; SUBCUTANEOUS EVERY 12 HOURS
Qty: 0 | Refills: 0 | Status: DISCONTINUED | OUTPATIENT
Start: 2017-12-12 | End: 2017-12-15

## 2017-12-12 RX ADMIN — ESCITALOPRAM OXALATE 5 MILLIGRAM(S): 10 TABLET, FILM COATED ORAL at 13:32

## 2017-12-12 RX ADMIN — Medication 2000 UNIT(S): at 13:31

## 2017-12-12 RX ADMIN — Medication 400 INTERNATIONAL UNIT(S): at 13:32

## 2017-12-12 RX ADMIN — SODIUM CHLORIDE 40 MILLILITER(S): 9 INJECTION INTRAMUSCULAR; INTRAVENOUS; SUBCUTANEOUS at 16:32

## 2017-12-12 RX ADMIN — Medication 25 MILLIGRAM(S): at 06:27

## 2017-12-12 RX ADMIN — Medication 1: at 13:31

## 2017-12-12 RX ADMIN — AMLODIPINE BESYLATE 5 MILLIGRAM(S): 2.5 TABLET ORAL at 06:27

## 2017-12-12 NOTE — PROGRESS NOTE ADULT - ASSESSMENT
89yo lady with DM went for evaluation of vaginal bleed and was found to have UTI. She was given Bactrim DS 1 BID x 7 days starting 12-1-17. She began hallucinating and walking steadily. She came to see me yesterday and then sent to our ER. Bactrim was d/c'd  CT head was neg.    Acute kidney injury; will consult Dr Magaña. Start IVF.  UTI; Consult ID. Repeat Urine culture.

## 2017-12-12 NOTE — CONSULT NOTE ADULT - SUBJECTIVE AND OBJECTIVE BOX
TRACY SNEED 89y Female  MRN-[id number]    Patient is a 89y old  Female who presents with a chief complaint of ams (11 Dec 2017 19:07)      HPI:  89f pmh dm2 htn hl p/w ams. Pt speaks Toishanese (dialect more similar to cantonese than mandarin), interpretation provided by daughters at bedside. Pt was referred to an obgyn for evaluation of vaginal bleeding and had a UA done that showed uti so pt was placed on bactrim 800/160mg bid on . Since then, daughters state pt has had more visual hallucinations, not slept very well and had gait instability, leaning to the right. Pt presented to the ED and a code stroke was called, CTH showed age appropriate involutional and ischemic gliotic changes, no hemorrhage. (11 Dec 2017 19:07)      PAST MEDICAL & SURGICAL HISTORY:  Back pain  Diverticulitis  Osteoporosis  Hypercholesteremia  HTN (Hypertension)  Diabetes Mellitus Type II  After Cataract, Bilateral   Section      Allergies    Januvia (Other)  Metanx (Other)  sulfamethizole (Other)    Intolerances        ANTIMICROBIALS:      MEDICATIONS  (STANDING):  amLODIPine   Tablet 5 milliGRAM(s) Oral daily  cholecalciferol 2000 Unit(s) Oral daily  dextrose 5%. 1000 milliLiter(s) (50 mL/Hr) IV Continuous <Continuous>  dextrose 50% Injectable 25 Gram(s) IV Push once  escitalopram 5 milliGRAM(s) Oral daily  heparin  Injectable 5000 Unit(s) SubCutaneous every 12 hours  influenza   Vaccine 0.5 milliLiter(s) IntraMuscular once  insulin lispro (HumaLOG) corrective regimen sliding scale   SubCutaneous three times a day before meals  metoprolol succinate ER 25 milliGRAM(s) Oral daily  sodium chloride 0.9%. 1000 milliLiter(s) (40 mL/Hr) IV Continuous <Continuous>  vitamin E 400 International Unit(s) Oral daily      Social History  Smoking:  Etoh:  Drug use:      FAMILY HISTORY:  Family history of diabetes mellitus (Father, Mother)      Vital Signs Last 24 Hrs  T(C): 36.6 (12 Dec 2017 07:29), Max: 37 (11 Dec 2017 19:50)  T(F): 97.8 (12 Dec 2017 07:29), Max: 98.6 (11 Dec 2017 19:50)  HR: 55 (12 Dec 2017 07:29) (55 - 88)  BP: 134/49 (12 Dec 2017 07:29) (120/54 - 164/76)  BP(mean): --  RR: 18 (12 Dec 2017 07:29) (17 - 18)  SpO2: 95% (12 Dec 2017 07:29) (95% - 95%)    CBC Full  -  ( 12 Dec 2017 07:31 )  WBC Count : 7.44 K/uL  Hemoglobin : 11.6 g/dL  Hematocrit : 34.3 %  Platelet Count - Automated : 247 K/uL  Mean Cell Volume : 95.0 fl  Mean Cell Hemoglobin : 32.1 pg  Mean Cell Hemoglobin Concentration : 33.8 gm/dL  Auto Neutrophil # : x  Auto Lymphocyte # : x  Auto Monocyte # : x  Auto Eosinophil # : x  Auto Basophil # : x  Auto Neutrophil % : x  Auto Lymphocyte % : x  Auto Monocyte % : x  Auto Eosinophil % : x  Auto Basophil % : x        135  |  97  |  19  ----------------------------<  203<H>  4.5   |  25  |  1.20    Ca    9.5      12 Dec 2017 14:52    TPro  7.5  /  Alb  4.3  /  TBili  0.2  /  DBili  x   /  AST  19  /  ALT  16  /  AlkPhos  47  12-11    LIVER FUNCTIONS - ( 11 Dec 2017 16:05 )  Alb: 4.3 g/dL / Pro: 7.5 g/dL / ALK PHOS: 47 U/L / ALT: 16 U/L RC / AST: 19 U/L / GGT: x           Urinalysis Basic - ( 11 Dec 2017 21:49 )    Color: PL Yellow / Appearance: SL Turbid / S.017 / pH: x  Gluc: x / Ketone: Trace  / Bili: Negative / Urobili: Negative   Blood: x / Protein: Trace / Nitrite: Negative   Leuk Esterase: Large / RBC: 5-10 /HPF / WBC >50 /HPF   Sq Epi: x / Non Sq Epi: Occasional /HPF / Bacteria: Few /HPF        MICROBIOLOGY:          v              RADIOLOGY TRACY SNEED 89y Female  MRN-[id number]    Patient is a 89y old  Female who presents with a chief complaint of ams (11 Dec 2017 19:07)      HPI:  89f pmh dm2 htn hl p/w ams. Pt speaks Toishanese (dialect more similar to cantonese than mandarin), interpretation provided by daughters at bedside. Pt was referred to an obgyn for evaluation of vaginal bleeding and had a UA done that showed uti so pt was placed on bactrim 800/160mg bid on . Since then, daughters state pt has had more visual hallucinations, not slept very well and had gait instability, leaning to the right. Pt presented to the ED and a code stroke was called, CTH showed age appropriate involutional and ischemic gliotic changes, no hemorrhage. (11 Dec 2017 19:07)    ID called due to recent UTI    Daughter at bedside helped with hx and translation -  pt doing better, no  complaints, confusion better    PAST MEDICAL & SURGICAL HISTORY:  Back pain  Diverticulitis  Osteoporosis  Hypercholesteremia  HTN (Hypertension)  Diabetes Mellitus Type II  After Cataract, Bilateral   Section      Allergies    Januvia (Other)  Metanx (Other)  sulfamethizole (Other)    Intolerances        ANTIMICROBIALS:      MEDICATIONS  (STANDING):  amLODIPine   Tablet 5 milliGRAM(s) Oral daily  cholecalciferol 2000 Unit(s) Oral daily  dextrose 5%. 1000 milliLiter(s) (50 mL/Hr) IV Continuous <Continuous>  dextrose 50% Injectable 25 Gram(s) IV Push once  escitalopram 5 milliGRAM(s) Oral daily  heparin  Injectable 5000 Unit(s) SubCutaneous every 12 hours  influenza   Vaccine 0.5 milliLiter(s) IntraMuscular once  insulin lispro (HumaLOG) corrective regimen sliding scale   SubCutaneous three times a day before meals  metoprolol succinate ER 25 milliGRAM(s) Oral daily  sodium chloride 0.9%. 1000 milliLiter(s) (40 mL/Hr) IV Continuous <Continuous>  vitamin E 400 International Unit(s) Oral daily      Social History  Smoking: former smoker  Etoh: no  Drug use: none      FAMILY HISTORY:  Family history of diabetes mellitus (Father, Mother)      Vital Signs Last 24 Hrs  T(C): 36.6 (12 Dec 2017 07:29), Max: 37 (11 Dec 2017 19:50)  T(F): 97.8 (12 Dec 2017 07:29), Max: 98.6 (11 Dec 2017 19:50)  HR: 55 (12 Dec 2017 07:29) (55 - 88)  BP: 134/49 (12 Dec 2017 07:29) (120/54 - 164/76)  BP(mean): --  RR: 18 (12 Dec 2017 07:29) (17 - 18)  SpO2: 95% (12 Dec 2017 07:29) (95% - 95%)    CBC Full  -  ( 12 Dec 2017 07:31 )  WBC Count : 7.44 K/uL  Hemoglobin : 11.6 g/dL  Hematocrit : 34.3 %  Platelet Count - Automated : 247 K/uL  Mean Cell Volume : 95.0 fl  Mean Cell Hemoglobin : 32.1 pg  Mean Cell Hemoglobin Concentration : 33.8 gm/dL  Auto Neutrophil # : x  Auto Lymphocyte # : x  Auto Monocyte # : x  Auto Eosinophil # : x  Auto Basophil # : x  Auto Neutrophil % : x  Auto Lymphocyte % : x  Auto Monocyte % : x  Auto Eosinophil % : x  Auto Basophil % : x    1212    135  |  97  |  19  ----------------------------<  203<H>  4.5   |  25  |  1.20    Ca    9.5      12 Dec 2017 14:52    TPro  7.5  /  Alb  4.3  /  TBili  0.2  /  DBili  x   /  AST  19  /  ALT  16  /  AlkPhos  47  12-11    LIVER FUNCTIONS - ( 11 Dec 2017 16:05 )  Alb: 4.3 g/dL / Pro: 7.5 g/dL / ALK PHOS: 47 U/L / ALT: 16 U/L RC / AST: 19 U/L / GGT: x           Urinalysis Basic - ( 11 Dec 2017 21:49 )    Color: PL Yellow / Appearance: SL Turbid / S.017 / pH: x  Gluc: x / Ketone: Trace  / Bili: Negative / Urobili: Negative   Blood: x / Protein: Trace / Nitrite: Negative   Leuk Esterase: Large / RBC: 5-10 /HPF / WBC >50 /HPF   Sq Epi: x / Non Sq Epi: Occasional /HPF / Bacteria: Few /HPF        MICROBIOLOGY:    RADIOLOGY    CT Brain Stroke Protocol (17 @ 16:08) >  Age-appropriate involutional and ischemic gliotic changes. No   hemorrhage. No change from 3/1/2017

## 2017-12-12 NOTE — PROGRESS NOTE ADULT - PROVIDER SPECIALTY LIST ADULT
Internal Medicine [facility] [place] 
[facility] 
[address] [id number] [phone] Work/School Note Date: 9/20/2017 To Whom It May concern: 
 
Reed Harrison was seen and treated today in the emergency room by the following provider(s): 
Attending Provider: Yin Tamayo MD 
Physician Assistant: Corrina Lane PA-C. Reed Harrison may return to work on 9/22/17. Sincerely, Cindi Wilson PA-C

## 2017-12-12 NOTE — CONSULT NOTE ADULT - ASSESSMENT
89y old  Female who presents with a chief complaint of ams with likely bactrim induced CNS side effects

## 2017-12-13 DIAGNOSIS — I10 ESSENTIAL (PRIMARY) HYPERTENSION: ICD-10-CM

## 2017-12-13 DIAGNOSIS — N17.9 ACUTE KIDNEY FAILURE, UNSPECIFIED: ICD-10-CM

## 2017-12-13 LAB
-  AMIKACIN: SIGNIFICANT CHANGE UP
-  AMPICILLIN/SULBACTAM: SIGNIFICANT CHANGE UP
-  AMPICILLIN: SIGNIFICANT CHANGE UP
-  AZTREONAM: SIGNIFICANT CHANGE UP
-  CEFAZOLIN: SIGNIFICANT CHANGE UP
-  CEFEPIME: SIGNIFICANT CHANGE UP
-  CEFOXITIN: SIGNIFICANT CHANGE UP
-  CEFTAZIDIME: SIGNIFICANT CHANGE UP
-  CEFTRIAXONE: SIGNIFICANT CHANGE UP
-  CIPROFLOXACIN: SIGNIFICANT CHANGE UP
-  ERTAPENEM: SIGNIFICANT CHANGE UP
-  GENTAMICIN: SIGNIFICANT CHANGE UP
-  IMIPENEM: SIGNIFICANT CHANGE UP
-  LEVOFLOXACIN: SIGNIFICANT CHANGE UP
-  MEROPENEM: SIGNIFICANT CHANGE UP
-  NITROFURANTOIN: SIGNIFICANT CHANGE UP
-  PIPERACILLIN/TAZOBACTAM: SIGNIFICANT CHANGE UP
-  TOBRAMYCIN: SIGNIFICANT CHANGE UP
-  TRIMETHOPRIM/SULFAMETHOXAZOLE: SIGNIFICANT CHANGE UP
BASOPHILS # BLD AUTO: 0.04 K/UL — SIGNIFICANT CHANGE UP (ref 0–0.2)
BASOPHILS NFR BLD AUTO: 0.5 % — SIGNIFICANT CHANGE UP (ref 0–2)
CHOLEST SERPL-MCNC: 185 MG/DL — SIGNIFICANT CHANGE UP (ref 10–199)
CULTURE RESULTS: SIGNIFICANT CHANGE UP
EOSINOPHIL # BLD AUTO: 0.19 K/UL — SIGNIFICANT CHANGE UP (ref 0–0.5)
EOSINOPHIL NFR BLD AUTO: 2.2 — SIGNIFICANT CHANGE UP
FOLATE SERPL-MCNC: >20 NG/ML — SIGNIFICANT CHANGE UP (ref 4.8–24.2)
GLUCOSE BLDC GLUCOMTR-MCNC: 140 MG/DL — HIGH (ref 70–99)
GLUCOSE BLDC GLUCOMTR-MCNC: 149 MG/DL — HIGH (ref 70–99)
GLUCOSE BLDC GLUCOMTR-MCNC: 207 MG/DL — HIGH (ref 70–99)
GLUCOSE BLDC GLUCOMTR-MCNC: 208 MG/DL — HIGH (ref 70–99)
HCT VFR BLD CALC: 37.5 % — SIGNIFICANT CHANGE UP (ref 34.5–45)
HDLC SERPL-MCNC: 69 MG/DL — SIGNIFICANT CHANGE UP (ref 40–125)
HGB BLD-MCNC: 12.4 G/DL — SIGNIFICANT CHANGE UP (ref 11.5–15.5)
IMM GRANULOCYTES NFR BLD AUTO: 0.2 % — SIGNIFICANT CHANGE UP (ref 0–1.5)
LIPID PNL WITH DIRECT LDL SERPL: 85 MG/DL — SIGNIFICANT CHANGE UP
LYMPHOCYTES # BLD AUTO: 1.96 K/UL — SIGNIFICANT CHANGE UP (ref 1–3.3)
LYMPHOCYTES # BLD AUTO: 22.3 % — SIGNIFICANT CHANGE UP (ref 13–44)
MAGNESIUM SERPL-MCNC: 1.8 MG/DL — SIGNIFICANT CHANGE UP (ref 1.6–2.6)
MCHC RBC-ENTMCNC: 31.9 PG — SIGNIFICANT CHANGE UP (ref 27–34)
MCHC RBC-ENTMCNC: 33.1 GM/DL — SIGNIFICANT CHANGE UP (ref 32–36)
MCV RBC AUTO: 96.4 FL — SIGNIFICANT CHANGE UP (ref 80–100)
METHOD TYPE: SIGNIFICANT CHANGE UP
MONOCYTES # BLD AUTO: 1.18 K/UL — HIGH (ref 0–0.9)
MONOCYTES NFR BLD AUTO: 13.4 % — SIGNIFICANT CHANGE UP (ref 2–14)
NEUTROPHILS # BLD AUTO: 5.39 K/UL — SIGNIFICANT CHANGE UP (ref 1.8–7.4)
NEUTROPHILS NFR BLD AUTO: 61.4 % — SIGNIFICANT CHANGE UP (ref 43–77)
ORGANISM # SPEC MICROSCOPIC CNT: SIGNIFICANT CHANGE UP
ORGANISM # SPEC MICROSCOPIC CNT: SIGNIFICANT CHANGE UP
PLATELET # BLD AUTO: 269 K/UL — SIGNIFICANT CHANGE UP (ref 150–400)
RBC # BLD: 3.89 M/UL — SIGNIFICANT CHANGE UP (ref 3.8–5.2)
RBC # FLD: 13.4 % — SIGNIFICANT CHANGE UP (ref 10.3–14.5)
SPECIMEN SOURCE: SIGNIFICANT CHANGE UP
T PALLIDUM AB TITR SER: NEGATIVE — SIGNIFICANT CHANGE UP
TOTAL CHOLESTEROL/HDL RATIO MEASUREMENT: 2.7 RATIO — LOW (ref 3.3–7.1)
TRIGL SERPL-MCNC: 155 MG/DL — HIGH (ref 10–149)
TSH SERPL-MCNC: 1.4 UIU/ML — SIGNIFICANT CHANGE UP (ref 0.27–4.2)
VIT B12 SERPL-MCNC: 1841 PG/ML — HIGH (ref 243–894)
WBC # BLD: 8.78 K/UL — SIGNIFICANT CHANGE UP (ref 3.8–10.5)
WBC # FLD AUTO: 8.78 K/UL — SIGNIFICANT CHANGE UP (ref 3.8–10.5)

## 2017-12-13 RX ADMIN — SODIUM CHLORIDE 40 MILLILITER(S): 9 INJECTION INTRAMUSCULAR; INTRAVENOUS; SUBCUTANEOUS at 09:19

## 2017-12-13 RX ADMIN — Medication 2: at 12:56

## 2017-12-13 RX ADMIN — Medication 25 MILLIGRAM(S): at 09:19

## 2017-12-13 RX ADMIN — Medication 400 INTERNATIONAL UNIT(S): at 12:57

## 2017-12-13 RX ADMIN — AMLODIPINE BESYLATE 5 MILLIGRAM(S): 2.5 TABLET ORAL at 05:27

## 2017-12-13 RX ADMIN — ESCITALOPRAM OXALATE 5 MILLIGRAM(S): 10 TABLET, FILM COATED ORAL at 12:57

## 2017-12-13 RX ADMIN — HEPARIN SODIUM 5000 UNIT(S): 5000 INJECTION INTRAVENOUS; SUBCUTANEOUS at 17:40

## 2017-12-13 RX ADMIN — Medication 2000 UNIT(S): at 12:57

## 2017-12-13 RX ADMIN — HEPARIN SODIUM 5000 UNIT(S): 5000 INJECTION INTRAVENOUS; SUBCUTANEOUS at 05:27

## 2017-12-13 NOTE — PROGRESS NOTE ADULT - ASSESSMENT
asymptomatic low level E coli bacteruria  Patient does not appear to be actively infected.  History suggests urinary bladder dysfunction in "Detrussor Instability with Decreased Contractility" pattern - daughter is clear that a urinary catheter would be very poorly tolerated. Anticholinergics should be avoided.      Bactrim is an unusual cause of encephalopathy. There is a case report suggesting this association:    Can J Psychiatry. 1993 Feb;38(1):56-8.   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  Nils DEBO </pubmed/?term=Nils%20JC%5BAuthor%5D&cauthor=true&cauthor_uid=[id number]>1, Bre CA </pubmed/?term=Zilli%20CA%5BAuthor%5D&cauthor=true&cauthor_uid=[id number]>, Rose IH </pubmed/?term=Gotlib%20IH%5BAuthor%5D&cauthor=true&cauthor_uid=[id number]>.    Suggest: maintain off antibiotics  Sonogram of bladder/kidneys

## 2017-12-13 NOTE — CONSULT NOTE ADULT - PROBLEM SELECTOR RECOMMENDATION 9
Patient with elevated Scr suspect likely hemodynamically mediated for which patient started on IVF yesterday. Repeat Scr and if improving towards baseline (0.6-0.8) with IVF, would defer further work up. If not improved, will check urine studies and bladder scan. Avoid nephrotoxins.
Chronic facial asymmetry with swelling. Ice, topical treatment.  Would recommend switching antibiotics/curtailing antibiotic treatment considering likely viral.  No further in patient neurologic interventions/treatment.   Follow up outpatient [phone] if necessary for underlying dementia.
-suspect bactrim induced side effects causing CNS issues  -no obvious s/s of infection  -hold off abx  -not septic  -improving

## 2017-12-13 NOTE — PROGRESS NOTE ADULT - ASSESSMENT
89yo lady with DM went for evaluation of vaginal bleed and was found to have UTI. She was given Bactrim DS 1 BID x 7 days starting 12-1-17. She began hallucinating and walking unsteadily. She came to see me yesterday and then sent to our ER. Bactrim was d/c'd  CT head was neg.    Acute kidney injury; Dr Magaña's consult appreciated and will d/c IVF.  UTI; ID consult appreciated.  Unsteady gait ( new): will consult her neurologist, Dr Madison.  get pelvic US report

## 2017-12-13 NOTE — CONSULT NOTE ADULT - PROBLEM SELECTOR RECOMMENDATION 2
BP uncontrolled today. Change diet to low sodium. Expect BP to improve once IVF discontinued. Monitor BP.
Normal parameters. Continue medications.

## 2017-12-13 NOTE — CONSULT NOTE ADULT - SUBJECTIVE AND OBJECTIVE BOX
[facility] NEPHROLOGY- CONSULTATION NOTE    89 year old female with history of HTN presents with AMS. Nephrology consulted for elevated Scr.    Patient appears to have a normal Scr at baseline as per labs from earlier this year. Scr on admission noted to be mildly above baseline with acute increase yesterday morning. Labs were subsequently repeated and revealed Scr decreased back to 1.2. No hypotensive episodes noted. No NSAIDS given. No IV contrast administered. Patient had been on bactrim as outpatient for UTI which had been discontinued on admission.    REVIEW OF SYSTEMS:  Gen: no changes in weight  HEENT: no rhinorrhea  Neck: no sore throat  Cards: no chest pain  Resp: no dyspnea  GI: no nausea or vomiting or diarrhea  : no dysuria or hematuria  Vascular: no LE edema  Derm: no rashes  Neuro: no numbness/tingling    Januvia (Other)  Metanx (Other)  sulfamethizole (Other)      Home Medications Reviewed  Hospital Medications:   MEDICATIONS  (STANDING):  amLODIPine   Tablet 5 milliGRAM(s) Oral daily  cholecalciferol 2000 Unit(s) Oral daily  dextrose 5%. 1000 milliLiter(s) (50 mL/Hr) IV Continuous <Continuous>  dextrose 50% Injectable 25 Gram(s) IV Push once  escitalopram 5 milliGRAM(s) Oral daily  heparin  Injectable 5000 Unit(s) SubCutaneous every 12 hours  influenza   Vaccine 0.5 milliLiter(s) IntraMuscular once  insulin lispro (HumaLOG) corrective regimen sliding scale   SubCutaneous three times a day before meals  insulin lispro (HumaLOG) corrective regimen sliding scale   SubCutaneous at bedtime  metoprolol succinate ER 25 milliGRAM(s) Oral daily  sodium chloride 0.9%. 1000 milliLiter(s) (40 mL/Hr) IV Continuous <Continuous>  vitamin E 400 International Unit(s) Oral daily      PAST MEDICAL & SURGICAL HISTORY:  Back pain  Diverticulitis  Osteoporosis  Hypercholesteremia  HTN (Hypertension)  Diabetes Mellitus Type II  After Cataract, Bilateral   Section      FAMILY HISTORY:  Family history of diabetes mellitus (Father, Mother)      SOCIAL HISTORY:  Denies toxic substance use     VITALS:  T(F): 97.8 (17 @ 04:12), Max: 98.3 (17 @ 18:36)  HR: 62 (17 @ 09:17)  BP: 164/62 (17 @ 04:12)  RR: 18 (17 @ 04:12)  SpO2: 95% (17 @ 04:12)  Wt(kg): --     @ 07:01  -   @ 07:00  --------------------------------------------------------  IN: 440 mL / OUT: 350 mL / NET: 90 mL      Height (cm): 142.24 ( @ 18:36)  Weight (kg): 77.5 ( @ 18:36)  BMI (kg/m2): 38.3 ( @ 18:36)  BSA (m2): 1.66 ( @ 18:36)    PHYSICAL EXAM:  Gen: NAD, calm  HEENT: MMM  Neck: no JVD  Cards: RRR, +S1/S2, no M/G/R  Resp: CTA B/L  GI: soft, NT/ND, NABS  : no CVA tenderness  Vascular: no LE edema B/L  Derm: no rashes  Neuro: non-focal    LABS:      135  |  97  |  19  ----------------------------<  203<H>  4.5   |  25  |  1.20    Ca    9.5      12 Dec 2017 14:52  Mg     1.8         TPro  7.5  /  Alb  4.3  /  TBili  0.2  /  DBili      /  AST  19  /  ALT  16  /  AlkPhos  47  12-11    Creatinine Trend: 1.20 <--, 1.58 <--, 1.25 <--                        12.4   8.78  )-----------( 269      ( 13 Dec 2017 07:22 )             37.5     Urine Studies:  Urinalysis Basic - ( 11 Dec 2017 21:49 )    Color: PL Yellow / Appearance: SL Turbid / S.017 / pH:   Gluc:  / Ketone: Trace  / Bili: Negative / Urobili: Negative   Blood:  / Protein: Trace / Nitrite: Negative   Leuk Esterase: Large / RBC: 5-10 /HPF / WBC >50 /HPF   Sq Epi:  / Non Sq Epi: Occasional /HPF / Bacteria: Few /HPF          RADIOLOGY & ADDITIONAL STUDIES:  < from: Xray Chest 1 View AP- PORTABLE-Urgent (17 @ 17:17) >  IMPRESSION:  Left upper lung field calcified granuloma unchanged from prior   examination.  The lungs are clear.    < end of copied text >

## 2017-12-13 NOTE — CONSULT NOTE ADULT - ATTENDING COMMENTS
Ruben Morfin  Attending Physician   Division of Infectious Disease  Pager #[phone]  After 5pm/weekend #[phone]    I am away on 12/13 and will return 12/14. ID available #[phone].
[facility] NEPHROLOGY  Reynaldo Chakraborty M.D.  Brandon Magaña D.O.  Rhonda Agee M.D.  Rea Samuels, MSN, ANP-C    Telephone: [phone]  Facsimile: [phone]    [address] [place] [address]

## 2017-12-13 NOTE — CONSULT NOTE ADULT - PROBLEM SELECTOR PROBLEM 1
Acute kidney injury
Facial droop
Antibiotics causing adverse effect in therapeutic use, initial encounter

## 2017-12-14 LAB
ANION GAP SERPL CALC-SCNC: 15 MMOL/L — SIGNIFICANT CHANGE UP (ref 5–17)
BUN SERPL-MCNC: 27 MG/DL — HIGH (ref 7–23)
CALCIUM SERPL-MCNC: 9.3 MG/DL — SIGNIFICANT CHANGE UP (ref 8.4–10.5)
CHLORIDE SERPL-SCNC: 103 MMOL/L — SIGNIFICANT CHANGE UP (ref 96–108)
CO2 SERPL-SCNC: 21 MMOL/L — LOW (ref 22–31)
CREAT SERPL-MCNC: 1.12 MG/DL — SIGNIFICANT CHANGE UP (ref 0.5–1.3)
CULTURE RESULTS: NO GROWTH — SIGNIFICANT CHANGE UP
GLUCOSE BLDC GLUCOMTR-MCNC: 132 MG/DL — HIGH (ref 70–99)
GLUCOSE BLDC GLUCOMTR-MCNC: 135 MG/DL — HIGH (ref 70–99)
GLUCOSE BLDC GLUCOMTR-MCNC: 202 MG/DL — HIGH (ref 70–99)
GLUCOSE BLDC GLUCOMTR-MCNC: 280 MG/DL — HIGH (ref 70–99)
GLUCOSE SERPL-MCNC: 140 MG/DL — HIGH (ref 70–99)
HCT VFR BLD CALC: 36.2 % — SIGNIFICANT CHANGE UP (ref 34.5–45)
HGB BLD-MCNC: 11.9 G/DL — SIGNIFICANT CHANGE UP (ref 11.5–15.5)
MCHC RBC-ENTMCNC: 31.8 PG — SIGNIFICANT CHANGE UP (ref 27–34)
MCHC RBC-ENTMCNC: 32.9 GM/DL — SIGNIFICANT CHANGE UP (ref 32–36)
MCV RBC AUTO: 96.8 FL — SIGNIFICANT CHANGE UP (ref 80–100)
PLATELET # BLD AUTO: 288 K/UL — SIGNIFICANT CHANGE UP (ref 150–400)
POTASSIUM SERPL-MCNC: 4.7 MMOL/L — SIGNIFICANT CHANGE UP (ref 3.5–5.3)
POTASSIUM SERPL-SCNC: 4.7 MMOL/L — SIGNIFICANT CHANGE UP (ref 3.5–5.3)
RBC # BLD: 3.74 M/UL — LOW (ref 3.8–5.2)
RBC # FLD: 13.3 % — SIGNIFICANT CHANGE UP (ref 10.3–14.5)
SODIUM SERPL-SCNC: 139 MMOL/L — SIGNIFICANT CHANGE UP (ref 135–145)
SPECIMEN SOURCE: SIGNIFICANT CHANGE UP
WBC # BLD: 7.22 K/UL — SIGNIFICANT CHANGE UP (ref 3.8–10.5)
WBC # FLD AUTO: 7.22 K/UL — SIGNIFICANT CHANGE UP (ref 3.8–10.5)

## 2017-12-14 PROCEDURE — 99232 SBSQ HOSP IP/OBS MODERATE 35: CPT

## 2017-12-14 PROCEDURE — 76770 US EXAM ABDO BACK WALL COMP: CPT | Mod: 26

## 2017-12-14 RX ADMIN — Medication 2000 UNIT(S): at 12:14

## 2017-12-14 RX ADMIN — AMLODIPINE BESYLATE 5 MILLIGRAM(S): 2.5 TABLET ORAL at 05:02

## 2017-12-14 RX ADMIN — HEPARIN SODIUM 5000 UNIT(S): 5000 INJECTION INTRAVENOUS; SUBCUTANEOUS at 17:38

## 2017-12-14 RX ADMIN — HEPARIN SODIUM 5000 UNIT(S): 5000 INJECTION INTRAVENOUS; SUBCUTANEOUS at 05:02

## 2017-12-14 RX ADMIN — Medication 25 MILLIGRAM(S): at 05:02

## 2017-12-14 RX ADMIN — ESCITALOPRAM OXALATE 5 MILLIGRAM(S): 10 TABLET, FILM COATED ORAL at 12:14

## 2017-12-14 RX ADMIN — Medication 1: at 21:12

## 2017-12-14 RX ADMIN — Medication 2: at 12:14

## 2017-12-14 RX ADMIN — Medication 400 INTERNATIONAL UNIT(S): at 12:14

## 2017-12-14 NOTE — PHYSICAL THERAPY INITIAL EVALUATION ADULT - ACTIVE RANGE OF MOTION EXAMINATION, REHAB EVAL
bilateral upper extremity Active ROM was WFL (within functional limits)/grossly assessed in movement/bilateral  lower extremity Active ROM was WFL (within functional limits)

## 2017-12-14 NOTE — PROGRESS NOTE ADULT - ASSESSMENT
89yo lady with DM went for evaluation of vaginal bleed and was found to have UTI. She was given Bactrim DS 1 BID x 7 days starting 12-1-17. She began hallucinating and walking unsteadily. She came to see me on 12-11-17 and then sent to our ER. Bactrim was d/c'd  CT head was neg.    Acute kidney injury;   UTI; ID consult appreciated.  Unsteady gait ( new): will consult  neurologist.   pelvic US reportno I put into her drawer. 89yo lady with DM went for evaluation of vaginal bleed and was found to have UTI. She was given Bactrim DS 1 BID x 7 days starting 12-1-17. She began hallucinating and walking unsteadily. She came to see me on 12-11-17 and then sent to our ER. Bactrim was d/c'd  CT head was neg.    Acute kidney injury;   UTI; ID consult appreciated.  Unsteady gait ( new): No more w/u, PT eval.   pelvic US reportno I put into her drawer: NP contacted GYN dept, Endometrial bx is done out pt. Will tell daughter.

## 2017-12-14 NOTE — PHYSICAL THERAPY INITIAL EVALUATION ADULT - GAIT DEVIATIONS NOTED, PT EVAL
B knee flexed first 10 feet of ambulation, then pt corrected posture; pt with slightly unsteady gait, VC for posture and sequencing

## 2017-12-14 NOTE — PHYSICAL THERAPY INITIAL EVALUATION ADULT - CRITERIA FOR SKILLED THERAPEUTIC INTERVENTIONS
therapy frequency/anticipated discharge recommendation/predicted duration of therapy intervention/anticipated equipment needs at discharge/impairments found

## 2017-12-14 NOTE — PHYSICAL THERAPY INITIAL EVALUATION ADULT - PERTINENT HX OF CURRENT PROBLEM, REHAB EVAL
89f pmh dm2 htn hl p/w ams. Pt speaks Toishanese (dialect more similar to cantonese than mandarin), interpretation provided by daughters at bedside. Pt was referred to an obgyn for evaluation of vaginal bleeding and had a UA done that showed uti so pt was placed on bactrim 800/160mg bid on 12/9. Since then, daughters state pt has had more visual hallucinations, not slept very well and had gait instability, leaning to the right. CXR: L upper lung field calcified granuloma, lungs clears

## 2017-12-14 NOTE — PHYSICAL THERAPY INITIAL EVALUATION ADULT - DISCHARGE DISPOSITION, PT EVAL
home w/ home PT/for balance, gait and strengthening and assistance for all functional mobility/activities, family available to assist with needs

## 2017-12-14 NOTE — PHYSICAL THERAPY INITIAL EVALUATION ADULT - ADDITIONAL COMMENTS
pt states lives with daughter in private house, daughter arrived at end of PT eval, confirms history as: pt has 4 daughters in which she resides a short time with each child, pt ambulates with rollator, daughter states slight confusion is baseline, pt does not know , current year. family assists with dressing and ADLs

## 2017-12-15 ENCOUNTER — TRANSCRIPTION ENCOUNTER (OUTPATIENT)
Age: 82
End: 2017-12-15

## 2017-12-15 VITALS
OXYGEN SATURATION: 96 % | SYSTOLIC BLOOD PRESSURE: 134 MMHG | RESPIRATION RATE: 18 BRPM | TEMPERATURE: 98 F | DIASTOLIC BLOOD PRESSURE: 68 MMHG | HEART RATE: 53 BPM

## 2017-12-15 LAB
ANION GAP SERPL CALC-SCNC: 10 MMOL/L — SIGNIFICANT CHANGE UP (ref 5–17)
BUN SERPL-MCNC: 25 MG/DL — HIGH (ref 7–23)
CALCIUM SERPL-MCNC: 9.2 MG/DL — SIGNIFICANT CHANGE UP (ref 8.4–10.5)
CHLORIDE SERPL-SCNC: 103 MMOL/L — SIGNIFICANT CHANGE UP (ref 96–108)
CO2 SERPL-SCNC: 23 MMOL/L — SIGNIFICANT CHANGE UP (ref 22–31)
CREAT SERPL-MCNC: 0.94 MG/DL — SIGNIFICANT CHANGE UP (ref 0.5–1.3)
GLUCOSE BLDC GLUCOMTR-MCNC: 130 MG/DL — HIGH (ref 70–99)
GLUCOSE BLDC GLUCOMTR-MCNC: 164 MG/DL — HIGH (ref 70–99)
GLUCOSE BLDC GLUCOMTR-MCNC: 221 MG/DL — HIGH (ref 70–99)
GLUCOSE SERPL-MCNC: 124 MG/DL — HIGH (ref 70–99)
POTASSIUM SERPL-MCNC: 4.5 MMOL/L — SIGNIFICANT CHANGE UP (ref 3.5–5.3)
POTASSIUM SERPL-SCNC: 4.5 MMOL/L — SIGNIFICANT CHANGE UP (ref 3.5–5.3)
SODIUM SERPL-SCNC: 136 MMOL/L — SIGNIFICANT CHANGE UP (ref 135–145)

## 2017-12-15 PROCEDURE — 99232 SBSQ HOSP IP/OBS MODERATE 35: CPT

## 2017-12-15 RX ORDER — ROSUVASTATIN CALCIUM 5 MG/1
1 TABLET ORAL
Qty: 0 | Refills: 0 | COMMUNITY

## 2017-12-15 RX ADMIN — Medication 2: at 12:05

## 2017-12-15 RX ADMIN — HEPARIN SODIUM 5000 UNIT(S): 5000 INJECTION INTRAVENOUS; SUBCUTANEOUS at 17:40

## 2017-12-15 RX ADMIN — Medication 25 MILLIGRAM(S): at 05:03

## 2017-12-15 RX ADMIN — Medication 400 INTERNATIONAL UNIT(S): at 12:05

## 2017-12-15 RX ADMIN — AMLODIPINE BESYLATE 5 MILLIGRAM(S): 2.5 TABLET ORAL at 05:03

## 2017-12-15 RX ADMIN — Medication 2000 UNIT(S): at 12:05

## 2017-12-15 RX ADMIN — ESCITALOPRAM OXALATE 5 MILLIGRAM(S): 10 TABLET, FILM COATED ORAL at 12:05

## 2017-12-15 RX ADMIN — HEPARIN SODIUM 5000 UNIT(S): 5000 INJECTION INTRAVENOUS; SUBCUTANEOUS at 05:03

## 2017-12-15 RX ADMIN — Medication 1: at 17:04

## 2017-12-15 NOTE — CHART NOTE - NSCHARTNOTEFT_GEN_A_CORE
Request from Dr. Fontana to facilitate patient discharge. Medication reconciliation reviewed, revised, and resolved with Dr. Fontana who had medically cleared patient for discharge with follow-up as advised. Please refer to discharge note for detailed hospital course. Patient is currently stable for discharge to home with home PT at this time.    Contact # [phone]

## 2017-12-15 NOTE — PROGRESS NOTE ADULT - RESPIRATORY
Breath Sounds equal & clear to percussion & auscultation, no accessory muscle use
detailed exam
Breath Sounds equal & clear to percussion & auscultation, no accessory muscle use

## 2017-12-15 NOTE — PROGRESS NOTE ADULT - SUBJECTIVE AND OBJECTIVE BOX
Patient is a 89y old  Female who presents with a chief complaint of ams (11 Dec 2017 19:07)       Pt is seen and examined  pt is awake and lying in bed  pt seems comfortable     MEDICATIONS  (STANDING):  amLODIPine   Tablet 5 milliGRAM(s) Oral daily  cholecalciferol 2000 Unit(s) Oral daily  dextrose 5%. 1000 milliLiter(s) (50 mL/Hr) IV Continuous <Continuous>  dextrose 50% Injectable 25 Gram(s) IV Push once  escitalopram 5 milliGRAM(s) Oral daily  heparin  Injectable 5000 Unit(s) SubCutaneous every 12 hours  influenza   Vaccine 0.5 milliLiter(s) IntraMuscular once  insulin lispro (HumaLOG) corrective regimen sliding scale   SubCutaneous three times a day before meals  insulin lispro (HumaLOG) corrective regimen sliding scale   SubCutaneous at bedtime  metoprolol succinate ER 25 milliGRAM(s) Oral daily  vitamin E 400 International Unit(s) Oral daily    MEDICATIONS  (PRN):  dextrose Gel 1 Dose(s) Oral once PRN Blood Glucose LESS THAN 70 milliGRAM(s)/deciliter  glucagon  Injectable 1 milliGRAM(s) IntraMuscular once PRN Glucose LESS THAN 70 milligrams/deciliter      Allergies    Januvia (Other)  Metanx (Other)  sulfamethizole (Other)    Intolerances        Vital Signs Last 24 Hrs  T(C): 36.6 (13 Dec 2017 11:18), Max: 36.8 (12 Dec 2017 18:36)  T(F): 97.8 (13 Dec 2017 11:18), Max: 98.3 (12 Dec 2017 18:36)  HR: 57 (13 Dec 2017 11:18) (56 - 62)  BP: 170/70 (13 Dec 2017 11:18) (134/58 - 174/70)  BP(mean): --  RR: 18 (13 Dec 2017 11:18) (16 - 18)  SpO2: 97% (13 Dec 2017 11:18) (94% - 97%)    LABS:                          12.4   8.78  )-----------( 269      ( 13 Dec 2017 07:22 )             37.5         Mean Cell Volume : 96.4 fl  Mean Cell Hemoglobin : 31.9 pg  Mean Cell Hemoglobin Concentration : 33.1 gm/dL  Auto Neutrophil # : 5.39 K/uL  Auto Lymphocyte # : 1.96 K/uL  Auto Monocyte # : 1.18 K/uL  Auto Eosinophil # : 0.19 K/uL  Auto Basophil # : 0.04 K/uL  Auto Neutrophil % : 61.4 %  Auto Lymphocyte % : 22.3 %  Auto Monocyte % : 13.4 %  Auto Eosinophil % : 2.2  Auto Basophil % : 0.5 %    Serial CBC's  12-13 @ 07:22  Hct-37.5 / Hgb-12.4 / Plat-269 / RBC-3.89 / WBC-8.78          Serial CBC's  12-12 @ 07:31  Hct-34.3 / Hgb-11.6 / Plat-247 / RBC-3.61 / WBC-7.44          Serial CBC's  12-11 @ 16:05  Hct-38.0 / Hgb-13.2 / Plat-275 / RBC-3.83 / WBC-8.7            12-12    135  |  97  |  19  ----------------------------<  203<H>  4.5   |  25  |  1.20    Ca    9.5      12 Dec 2017 14:52  Mg     1.8     12-13    TPro  7.5  /  Alb  4.3  /  TBili  0.2  /  DBili  x   /  AST  19  /  ALT  16  /  AlkPhos  47  12-11      PT/INR - ( 11 Dec 2017 16:05 )   PT: 10.8 sec;   INR: 1.00 ratio         PTT - ( 11 Dec 2017 16:05 )  PTT:33.8 sec    Folate, Serum: >20.0 ng/mL (12-13-17 @ 08:45)  Vitamin B12, Serum: 1841 pg/mL (12-13-17 @ 08:45)  WBC Count: 8.78 K/uL (12-13-17 @ 07:22)  Hemoglobin: 12.4 g/dL (12-13-17 @ 07:22)  Hematocrit: 37.5 % (12-13-17 @ 07:22)  Platelet Count - Automated: 269 K/uL (12-13-17 @ 07:22)  WBC Count: 7.44 K/uL (12-12-17 @ 07:31)  Hemoglobin: 11.6 g/dL (12-12-17 @ 07:31)  Hematocrit: 34.3 % (12-12-17 @ 07:31)  Platelet Count - Automated: 247 K/uL (12-12-17 @ 07:31)  WBC Count: 8.7 K/uL (12-11-17 @ 16:05)  Hemoglobin: 13.2 g/dL (12-11-17 @ 16:05)  Hematocrit: 38.0 % (12-11-17 @ 16:05)  Platelet Count - Automated: 275 K/uL (12-11-17 @ 16
Follow Up:      Interval History/ROS: discussed at length with daughter at bedside - Patient improved, slept much better last night.  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 She has urinary urgency and grunts in effort to expel urine, but no clear dysuria, fever or local pain.    Allergies  Januvia (Other)  Metanx (Other)  sulfamethizole (Other)    ANTIMICROBIALS:  none    OTHER MEDS:  MEDICATIONS  (STANDING):  amLODIPine   Tablet 5 daily  dextrose 50% Injectable 25 once  dextrose Gel 1 once PRN  escitalopram 5 daily  glucagon  Injectable 1 once PRN  heparin  Injectable 5000 every 12 hours  influenza   Vaccine 0.5 once  insulin lispro (HumaLOG) corrective regimen sliding scale  three times a day before meals  insulin lispro (HumaLOG) corrective regimen sliding scale  at bedtime  metoprolol succinate ER 25 daily    Vital Signs Last 24 Hrs  T(C): 36.6 (13 Dec 2017 11:18), Max: 36.8 (12 Dec 2017 18:36)  T(F): 97.8 (13 Dec 2017 11:18), Max: 98.3 (12 Dec 2017 18:36)  HR: 57 (13 Dec 2017 11:18) (56 - 62)  BP: 170/70 (13 Dec 2017 11:18) (134/58 - 174/70)  BP(mean): --  RR: 18 (13 Dec 2017 11:18) (16 - 18)  SpO2: 97% (13 Dec 2017 11:18) (94% - 97%)    PHYSICAL EXAM:  General: sleeping comfortably  Respiratory: no resp distress  Line Sites: Clear  Skin: No rash                        12.4   8.78  )-----------( 269      ( 13 Dec 2017 07:22 )             37.5       -    135  |  97  |  19  ----------------------------<  203<H>  4.5   |  25  |  1.20    Ca    9.5      12 Dec 2017 14:52  Mg     1.8         TPro  7.5  /  Alb  4.3  /  TBili  0.2  /  DBili  x   /  AST  19  /  ALT  16  /  AlkPhos  47  12-11      Urinalysis Basic - ( 11 Dec 2017 21:49 )    Color: PL Yellow / Appearance: SL Turbid / S.017 / pH: x  Gluc: x / Ketone: Trace  / Bili: Negative / Urobili: Negative   Blood: x / Protein: Trace / Nitrite: Negative   Leuk Esterase: Large / RBC: 5-10 /HPF / WBC >50 /HPF   Sq Epi: x / Non Sq Epi: Occasional /HPF / Bacteria: Few /HPF      MICROBIOLOGY:  .Urine Clean Catch (Midstream)  17   10,000 - 49,000 CFU/mL Escherichia coli  --  --      .Urine Clean Catch (Midstream)      RADIOLOGY:  < from: Xray Chest 1 View AP- PORTABLE-Urgent (17 @ 17:17) >  IMPRESSION:  Left upper lung field calcified granuloma unchanged from prior   examination.  The lungs are clear.    < end of copied text >  < from: CT Brain Stroke Protocol (17 @ 16:08) >  IMPRESSION: Age-appropriate involutional and ischemic gliotic changes. No   hemorrhage. No change from 3/1/2017.    < end of copied text >      Estevan Crawford MD; Division of Infectious Disease; Pager: [phone]; nights and weekends: [phone]
[facility] NEPHROLOGY- PROGRESS NOTE    89 year old female with history of HTN presents with AMS. Nephrology consulted for elevated Scr.     REVIEW OF SYSTEMS:  Gen: no changes in weight  Cards: no chest pain  Resp: no dyspnea  GI: no nausea or vomiting or diarrhea  Vascular: no LE edema    Januvia (Other)  Metanx (Other)  sulfamethizole (Other)      Hospital Medications: Medications reviewed    VITALS:  T(F): 98.8 (17 @ 03:56), Max: 98.8 (17 @ 03:56)  HR: 61 (17 @ 05:02)  BP: 137/71 (17 @ 03:56)  RR: 18 (17 @ 03:56)  SpO2: 94% (17 @ 03:56)  Wt(kg): --  Height (cm): 142.24 ( 18:36)  Weight (kg): 77.5 ( 18:36)  BMI (kg/m2): 38.3 ( 18:36)  BSA (m2): 1.66 ( @ 18:36)     @ 07:01  -   @ 07:00  --------------------------------------------------------  IN: 1580 mL / OUT: 0 mL / NET: 1580 mL      PHYSICAL EXAM:    Gen: NAD, calm  Cards: RRR, +S1/S2, no M/G/R  Resp: CTA B/L  GI: soft, NT/ND, NABS  Vascular: no LE edema B/L    LABS:      135  |  97  |  19  ----------------------------<  203<H>  4.5   |  25  |  1.20    Ca    9.5      12 Dec 2017 14:52  Mg     1.8           Creatinine Trend: 1.20 <--, 1.58 <--, 1.25 <--                        12.4   8.78  )-----------( 269      ( 13 Dec 2017 07:22 )             37.5     Urine Studies:  Urinalysis Basic - ( 11 Dec 2017 21:49 )    Color: PL Yellow / Appearance: SL Turbid / S.017 / pH:   Gluc:  / Ketone: Trace  / Bili: Negative / Urobili: Negative   Blood:  / Protein: Trace / Nitrite: Negative   Leuk Esterase: Large / RBC: 5-10 /HPF / WBC >50 /HPF   Sq Epi:  / Non Sq Epi: Occasional /HPF / Bacteria: Few /HPF
[facility] NEPHROLOGY- PROGRESS NOTE    89 year old female with history of HTN presents with AMS. Nephrology consulted for elevated Scr.     REVIEW OF SYSTEMS:  Gen: no changes in weight  Cards: no chest pain  Resp: no dyspnea  GI: no nausea or vomiting or diarrhea  Vascular: no LE edema      Hospital Medications: Medications reviewed      VITALS:  T(F): 98.2 (12-15-17 @ 03:51), Max: 98.3 (12-14-17 @ 20:50)  HR: 52 (12-15-17 @ 03:51)  BP: 156/68 (12-15-17 @ 03:51)  RR: 18 (12-15-17 @ 03:51)  SpO2: 97% (12-15-17 @ 03:51)  Wt(kg): --    12-14 @ 07:01  -  12-15 @ 07:00  --------------------------------------------------------  IN: 420 mL / OUT: 0 mL / NET: 420 mL      PHYSICAL EXAM:    Gen: NAD, calm  Cards: RRR, +S1/S2, no M/G/R  Resp: CTA B/L  GI: soft, NT/ND, NABS  Vascular: no LE edema B/L      LABS:  12-15    136  |  103  |  25<H>  ----------------------------<  124<H>  4.5   |  23  |  0.94    Ca    9.2      15 Dec 2017 07:36      Creatinine Trend: 0.94 <--, 1.12 <--, 1.20 <--, 1.58 <--, 1.25 <--                        11.9   7.22  )-----------( 288      ( 14 Dec 2017 07:38 )             36.2
Patient is a 89y old  Female who presents with a chief complaint of ams (11 Dec 2017 19:07)       Pt is seen and examined  pt is awake and lying in bed  pt seems comfortable          ROS:  Negative except for:    MEDICATIONS  (STANDING):  amLODIPine   Tablet 5 milliGRAM(s) Oral daily  cholecalciferol 2000 Unit(s) Oral daily  dextrose 5%. 1000 milliLiter(s) (50 mL/Hr) IV Continuous <Continuous>  dextrose 50% Injectable 25 Gram(s) IV Push once  escitalopram 5 milliGRAM(s) Oral daily  heparin  Injectable 5000 Unit(s) SubCutaneous every 12 hours  influenza   Vaccine 0.5 milliLiter(s) IntraMuscular once  insulin lispro (HumaLOG) corrective regimen sliding scale   SubCutaneous three times a day before meals  metoprolol succinate ER 25 milliGRAM(s) Oral daily  vitamin E 400 International Unit(s) Oral daily    MEDICATIONS  (PRN):  dextrose Gel 1 Dose(s) Oral once PRN Blood Glucose LESS THAN 70 milliGRAM(s)/deciliter  glucagon  Injectable 1 milliGRAM(s) IntraMuscular once PRN Glucose LESS THAN 70 milligrams/deciliter      Allergies    Januvia (Other)  Metanx (Other)  sulfamethizole (Other)    Intolerances        Vital Signs Last 24 Hrs  T(C): 36.6 (12 Dec 2017 07:29), Max: 37.2 (11 Dec 2017 15:47)  T(F): 97.8 (12 Dec 2017 07:29), Max: 99 (11 Dec 2017 15:47)  HR: 55 (12 Dec 2017 07:29) (55 - 88)  BP: 134/49 (12 Dec 2017 07:29) (120/54 - 164/76)  BP(mean): --  RR: 18 (12 Dec 2017 07:29) (17 - 18)  SpO2: 95% (12 Dec 2017 07:29) (95% - 98%)    LABS:                          11.6   7.44  )-----------( 247      ( 12 Dec 2017 07:31 )             34.3         Mean Cell Volume : 95.0 fl  Mean Cell Hemoglobin : 32.1 pg  Mean Cell Hemoglobin Concentration : 33.8 gm/dL  Auto Neutrophil # : x  Auto Lymphocyte # : x  Auto Monocyte # : x  Auto Eosinophil # : x  Auto Basophil # : x  Auto Neutrophil % : x  Auto Lymphocyte % : x  Auto Monocyte % : x  Auto Eosinophil % : x  Auto Basophil % : x    Serial CBC's  12-12 @ 07:31  Hct-34.3 / Hgb-11.6 / Plat-247 / RBC-3.61 / WBC-7.44          Serial CBC's  12-11 @ 16:05  Hct-38.0 / Hgb-13.2 / Plat-275 / RBC-3.83 / WBC-8.7            12-12    136  |  97  |  22  ----------------------------<  121<H>  4.5   |  24  |  1.58<H>    Ca    9.7      12 Dec 2017 07:37    TPro  7.5  /  Alb  4.3  /  TBili  0.2  /  DBili  x   /  AST  19  /  ALT  16  /  AlkPhos  47  12-11      PT/INR - ( 11 Dec 2017 16:05 )   PT: 10.8 sec;   INR: 1.00 ratio         PTT - ( 11 Dec 2017 16:05 )  PTT:33.8 sec    WBC Count: 7.44 K/uL (12-12-17 @ 07:31)  Hemoglobin: 11.6 g/dL (12-12-17 @ 07:31)  Hematocrit: 34.3 % (12-12-17 @ 07:31)  Platelet Count - Automated: 247 K/uL (12-12-17 @ 07:31)  WBC Count: 8.7 K/uL (12-11-17 @ 16:05)  Hemoglobin: 13.2 g/dL (12-11-17 @ 16:05)  Hematocrit: 38.0 % (12-11-17 @ 16:05)  Platelet Count - Automated: 275 K/uL (12-11-17 @ 16:05)
Patient is a 89y old  Female who presents with a chief complaint of ams (11 Dec 2017 19:07)       Pt is seen and examined  pt is awake and lying in bed  pt seems comfortable and denies any complaints at this time    MEDICATIONS  (STANDING):  amLODIPine   Tablet 5 milliGRAM(s) Oral daily  cholecalciferol 2000 Unit(s) Oral daily  dextrose 5%. 1000 milliLiter(s) (50 mL/Hr) IV Continuous <Continuous>  dextrose 50% Injectable 25 Gram(s) IV Push once  escitalopram 5 milliGRAM(s) Oral daily  heparin  Injectable 5000 Unit(s) SubCutaneous every 12 hours  influenza   Vaccine 0.5 milliLiter(s) IntraMuscular once  insulin lispro (HumaLOG) corrective regimen sliding scale   SubCutaneous three times a day before meals  insulin lispro (HumaLOG) corrective regimen sliding scale   SubCutaneous at bedtime  metoprolol succinate ER 25 milliGRAM(s) Oral daily  vitamin E 400 International Unit(s) Oral daily    MEDICATIONS  (PRN):  dextrose Gel 1 Dose(s) Oral once PRN Blood Glucose LESS THAN 70 milliGRAM(s)/deciliter  glucagon  Injectable 1 milliGRAM(s) IntraMuscular once PRN Glucose LESS THAN 70 milligrams/deciliter      Allergies    Januvia (Other)  Metanx (Other)  sulfamethizole (Other)          Vital Signs Last 24 Hrs  T(C): 36.6 (15 Dec 2017 11:38), Max: 36.8 (14 Dec 2017 20:50)  T(F): 97.8 (15 Dec 2017 11:38), Max: 98.3 (14 Dec 2017 20:50)  HR: 53 (15 Dec 2017 11:38) (52 - 55)  BP: 134/68 (15 Dec 2017 11:38) (118/61 - 156/68)  BP(mean): --  RR: 18 (15 Dec 2017 11:38) (18 - 18)  SpO2: 96% (15 Dec 2017 11:38) (96% - 97%)    LABS:                          11.9   7.22  )-----------( 288      ( 14 Dec 2017 07:38 )             36.2         Mean Cell Volume : 96.8 fl  Mean Cell Hemoglobin : 31.8 pg  Mean Cell Hemoglobin Concentration : 32.9 gm/dL  Auto Neutrophil # : x  Auto Lymphocyte # : x  Auto Monocyte # : x  Auto Eosinophil # : x  Auto Basophil # : x  Auto Neutrophil % : x  Auto Lymphocyte % : x  Auto Monocyte % : x  Auto Eosinophil % : x  Auto Basophil % : x    Serial CBC's  12-14 @ 07:38  Hct-36.2 / Hgb-11.9 / Plat-288 / RBC-3.74 / WBC-7.22          Serial CBC's  12-13 @ 07:22  Hct-37.5 / Hgb-12.4 / Plat-269 / RBC-3.89 / WBC-8.78          Serial CBC's  12-12 @ 07:31  Hct-34.3 / Hgb-11.6 / Plat-247 / RBC-3.61 / WBC-7.44          Serial CBC's  12-11 @ 16:05  Hct-38.0 / Hgb-13.2 / Plat-275 / RBC-3.83 / WBC-8.7            12-15    136  |  103  |  25<H>  ----------------------------<  124<H>  4.5   |  23  |  0.94    Ca    9.2      15 Dec 2017 07:36            WBC Count: 7.22 K/uL (12-14-17 @ 07:38)  Hemoglobin: 11.9 g/dL (12-14-17 @ 07:38)  Hematocrit: 36.2 % (12-14-17 @ 07:38)  Platelet Count - Automated: 288 K/uL (12-14-17 @ 07:38)  Folate, Serum: >20.0 ng/mL (12-13-17 @ 08:45)  Vitamin B12, Serum: 1841 pg/mL (12-13-17 @ 08:45)  WBC Count: 8.78 K/uL (12-13-17 @ 07:22)  Hemoglobin: 12.4 g/dL (12-13-17 @ 07:22)  Hematocrit: 37.5 % (12-13-17 @ 07:22)  Platelet Count - Automated: 269 K/uL (12-13-17 @ 07:22)  WBC Count: 7.44 K/uL (12-12-17 @ 07:31)  Hemoglobin: 11.6 g/dL (12-12-17 @ 07:31)  Hematocrit: 34.3 % (12-12-17 @ 07:31)  Platelet Count - Automated: 247 K/uL (12-12-17 @ 07:31)  WBC Count: 8.7 K/uL (12-11-17 @ 16:05)  Hemoglobin: 13.2 g/dL (12-11-17 @ 16:05)  Hematocrit: 38.0 % (12-11-17 @ 16:05)  Platelet Count - Automated: 275 K/uL (12-11-17 @ 16:05)
TRACY SNEED 89y [id number]    Patient is a 89y old  Female who presents with a chief complaint of ams (11 Dec 2017 19:07)      Follow Up/CC:  AMS    Interval History/ROS: feels ok, no complaints    Allergies    Januvia (Other)  Metanx (Other)  sulfamethizole (Other)    Intolerances        ANTIMICROBIALS:      MEDICATIONS  (STANDING):  amLODIPine   Tablet 5 milliGRAM(s) Oral daily  cholecalciferol 2000 Unit(s) Oral daily  dextrose 5%. 1000 milliLiter(s) (50 mL/Hr) IV Continuous <Continuous>  dextrose 50% Injectable 25 Gram(s) IV Push once  escitalopram 5 milliGRAM(s) Oral daily  heparin  Injectable 5000 Unit(s) SubCutaneous every 12 hours  influenza   Vaccine 0.5 milliLiter(s) IntraMuscular once  insulin lispro (HumaLOG) corrective regimen sliding scale   SubCutaneous three times a day before meals  insulin lispro (HumaLOG) corrective regimen sliding scale   SubCutaneous at bedtime  metoprolol succinate ER 25 milliGRAM(s) Oral daily  vitamin E 400 International Unit(s) Oral daily    MEDICATIONS  (PRN):  dextrose Gel 1 Dose(s) Oral once PRN Blood Glucose LESS THAN 70 milliGRAM(s)/deciliter  glucagon  Injectable 1 milliGRAM(s) IntraMuscular once PRN Glucose LESS THAN 70 milligrams/deciliter        Vital Signs Last 24 Hrs  T(C): 36.6 (15 Dec 2017 11:38), Max: 36.8 (14 Dec 2017 20:50)  T(F): 97.8 (15 Dec 2017 11:38), Max: 98.3 (14 Dec 2017 20:50)  HR: 53 (15 Dec 2017 11:38) (52 - 55)  BP: 134/68 (15 Dec 2017 11:38) (118/61 - 156/68)  BP(mean): --  RR: 18 (15 Dec 2017 11:38) (18 - 18)  SpO2: 96% (15 Dec 2017 11:38) (96% - 97%)    CBC Full  -  ( 14 Dec 2017 07:38 )  WBC Count : 7.22 K/uL  Hemoglobin : 11.9 g/dL  Hematocrit : 36.2 %  Platelet Count - Automated : 288 K/uL  Mean Cell Volume : 96.8 fl  Mean Cell Hemoglobin : 31.8 pg  Mean Cell Hemoglobin Concentration : 32.9 gm/dL  Auto Neutrophil # : x  Auto Lymphocyte # : x  Auto Monocyte # : x  Auto Eosinophil # : x  Auto Basophil # : x  Auto Neutrophil % : x  Auto Lymphocyte % : x  Auto Monocyte % : x  Auto Eosinophil % : x  Auto Basophil % : x    12-15    136  |  103  |  25<H>  ----------------------------<  124<H>  4.5   |  23  |  0.94    Ca    9.2      15 Dec 2017 07:36            MICROBIOLOGY:  .Urine Clean Catch (Midstream)  12-13-17   No growth  --  --      .Urine Clean Catch (Midstream)  12-11-17   10,000 - 49,000 CFU/mL Escherichia coli  <10,000 CFU/ml Normal Urogenital radha present  --  Escherichia coli    RADIOLOGY    US Kidney and Bladder (12.14.17 @ 13:18) >  Left kidney is slightly smaller than the right, but otherwise   unremarkable exam.
TRACY SNEED 89y [id number]    Patient is a 89y old  Female who presents with a chief complaint of ams (11 Dec 2017 19:07)      Follow Up/CC:  AMS    Interval History/ROS: no acute issues    Allergies    Januvia (Other)  Metanx (Other)  sulfamethizole (Other)    Intolerances        ANTIMICROBIALS:      MEDICATIONS  (STANDING):  amLODIPine   Tablet 5 milliGRAM(s) Oral daily  cholecalciferol 2000 Unit(s) Oral daily  dextrose 5%. 1000 milliLiter(s) (50 mL/Hr) IV Continuous <Continuous>  dextrose 50% Injectable 25 Gram(s) IV Push once  escitalopram 5 milliGRAM(s) Oral daily  heparin  Injectable 5000 Unit(s) SubCutaneous every 12 hours  influenza   Vaccine 0.5 milliLiter(s) IntraMuscular once  insulin lispro (HumaLOG) corrective regimen sliding scale   SubCutaneous three times a day before meals  insulin lispro (HumaLOG) corrective regimen sliding scale   SubCutaneous at bedtime  metoprolol succinate ER 25 milliGRAM(s) Oral daily  vitamin E 400 International Unit(s) Oral daily    MEDICATIONS  (PRN):  dextrose Gel 1 Dose(s) Oral once PRN Blood Glucose LESS THAN 70 milliGRAM(s)/deciliter  glucagon  Injectable 1 milliGRAM(s) IntraMuscular once PRN Glucose LESS THAN 70 milligrams/deciliter        Vital Signs Last 24 Hrs  T(C): 36.6 (14 Dec 2017 12:06), Max: 37.1 (14 Dec 2017 03:56)  T(F): 97.8 (14 Dec 2017 12:06), Max: 98.8 (14 Dec 2017 03:56)  HR: 55 (14 Dec 2017 12:06) (54 - 62)  BP: 113/51 (14 Dec 2017 12:06) (113/51 - 137/71)  BP(mean): --  RR: 18 (14 Dec 2017 12:06) (18 - 18)  SpO2: 96% (14 Dec 2017 12:06) (94% - 96%)    CBC Full  -  ( 14 Dec 2017 07:38 )  WBC Count : 7.22 K/uL  Hemoglobin : 11.9 g/dL  Hematocrit : 36.2 %  Platelet Count - Automated : 288 K/uL  Mean Cell Volume : 96.8 fl  Mean Cell Hemoglobin : 31.8 pg  Mean Cell Hemoglobin Concentration : 32.9 gm/dL  Auto Neutrophil # : x  Auto Lymphocyte # : x  Auto Monocyte # : x  Auto Eosinophil # : x  Auto Basophil # : x  Auto Neutrophil % : x  Auto Lymphocyte % : x  Auto Monocyte % : x  Auto Eosinophil % : x  Auto Basophil % : x    12-14    139  |  103  |  27<H>  ----------------------------<  140<H>  4.7   |  21<L>  |  1.12    Ca    9.3      14 Dec 2017 07:35  Mg     1.8     12-13            MICROBIOLOGY:  .Urine Clean Catch (Midstream)  12-13-17   No growth  --  --      .Urine Clean Catch (Midstream)  12-11-17   10,000 - 49,000 CFU/mL Escherichia coli  <10,000 CFU/ml Normal Urogenital radha present  --  Escherichia coli              v            RADIOLOGY
Patient is a 89y old  Female who presents with a chief complaint of ams (11 Dec 2017 19:07)       Pt is seen and examined  pt is awake and lying in bed  pt seems comfortable and denies any complaints at this time          ROS:  Negative except for:    MEDICATIONS  (STANDING):  amLODIPine   Tablet 5 milliGRAM(s) Oral daily  cholecalciferol 2000 Unit(s) Oral daily  dextrose 5%. 1000 milliLiter(s) (50 mL/Hr) IV Continuous <Continuous>  dextrose 50% Injectable 25 Gram(s) IV Push once  escitalopram 5 milliGRAM(s) Oral daily  heparin  Injectable 5000 Unit(s) SubCutaneous every 12 hours  influenza   Vaccine 0.5 milliLiter(s) IntraMuscular once  insulin lispro (HumaLOG) corrective regimen sliding scale   SubCutaneous three times a day before meals  insulin lispro (HumaLOG) corrective regimen sliding scale   SubCutaneous at bedtime  metoprolol succinate ER 25 milliGRAM(s) Oral daily  vitamin E 400 International Unit(s) Oral daily    MEDICATIONS  (PRN):  dextrose Gel 1 Dose(s) Oral once PRN Blood Glucose LESS THAN 70 milliGRAM(s)/deciliter  glucagon  Injectable 1 milliGRAM(s) IntraMuscular once PRN Glucose LESS THAN 70 milligrams/deciliter      Allergies    Januvia (Other)  Metanx (Other)  sulfamethizole (Other)    Intolerances        Vital Signs Last 24 Hrs  T(C): 36.8 (14 Dec 2017 20:50), Max: 37.1 (14 Dec 2017 03:56)  T(F): 98.3 (14 Dec 2017 20:50), Max: 98.8 (14 Dec 2017 03:56)  HR: 55 (14 Dec 2017 20:50) (54 - 61)  BP: 118/61 (14 Dec 2017 20:50) (113/51 - 137/71)  BP(mean): --  RR: 18 (14 Dec 2017 20:50) (18 - 18)  SpO2: 97% (14 Dec 2017 20:50) (94% - 97%)    LABS:                          11.9   7.22  )-----------( 288      ( 14 Dec 2017 07:38 )             36.2         Mean Cell Volume : 96.8 fl  Mean Cell Hemoglobin : 31.8 pg  Mean Cell Hemoglobin Concentration : 32.9 gm/dL  Auto Neutrophil # : x  Auto Lymphocyte # : x  Auto Monocyte # : x  Auto Eosinophil # : x  Auto Basophil # : x  Auto Neutrophil % : x  Auto Lymphocyte % : x  Auto Monocyte % : x  Auto Eosinophil % : x  Auto Basophil % : x    Serial CBC's  12-14 @ 07:38  Hct-36.2 / Hgb-11.9 / Plat-288 / RBC-3.74 / WBC-7.22          Serial CBC's  12-13 @ 07:22  Hct-37.5 / Hgb-12.4 / Plat-269 / RBC-3.89 / WBC-8.78          Serial CBC's  12-12 @ 07:31  Hct-34.3 / Hgb-11.6 / Plat-247 / RBC-3.61 / WBC-7.44          Serial CBC's  12-11 @ 16:05  Hct-38.0 / Hgb-13.2 / Plat-275 / RBC-3.83 / WBC-8.7            12-14    139  |  103  |  27<H>  ----------------------------<  140<H>  4.7   |  21<L>  |  1.12    Ca    9.3      14 Dec 2017 07:35  Mg     1.8     12-13            WBC Count: 7.22 K/uL (12-14-17 @ 07:38)  Hemoglobin: 11.9 g/dL (12-14-17 @ 07:38)  Hematocrit: 36.2 % (12-14-17 @ 07:38)  Platelet Count - Automated: 288 K/uL (12-14-17 @ 07:38)  Folate, Serum: >20.0 ng/mL (12-13-17 @ 08:45)  Vitamin B12, Serum: 1841 pg/mL (12-13-17 @ 08:45)  WBC Count: 8.78 K/uL (12-13-17 @ 07:22)  Hemoglobin: 12.4 g/dL (12-13-17 @ 07:22)  Hematocrit: 37.5 % (12-13-17 @ 07:22)  Platelet Count - Automated: 269 K/uL (12-13-17 @ 07:22)  WBC Count: 7.44 K/uL (12-12-17 @ 07:31)  Hemoglobin: 11.6 g/dL (12-12-17 @ 07:31)  Hematocrit: 34.3 % (12-12-17 @ 07:31)  Platelet Count - Automated: 247 K/uL (12-12-17 @ 07:31)  WBC Count: 8.7 K/uL (12-11-17 @ 16:05)  Hemoglobin: 13.2 g/dL (12-11-17 @ 16:05)  Hematocrit: 38.0 % (12-11-17 @ 16:05)  Platelet Count - Automated: 275 K/uL (12-11-17 @ 16:05)                BLOOD SMEAR INTERPRETATION:       RADIOLOGY & ADDITIONAL STUDIES:

## 2017-12-15 NOTE — DISCHARGE NOTE ADULT - MEDICATION SUMMARY - MEDICATIONS TO STOP TAKING
I will STOP taking the medications listed below when I get home from the hospital:    heparin  -- 5000 unit(s) subcutaneous every 12 hours  dvt prophylaxis

## 2017-12-15 NOTE — PROGRESS NOTE ADULT - NEGATIVE RESPIRATORY AND THORAX SYMPTOMS
no cough/no dyspnea/no hemoptysis/no wheezing
no hemoptysis/no dyspnea/no cough
no dyspnea/no cough
no hemoptysis/no cough/no wheezing

## 2017-12-15 NOTE — DISCHARGE NOTE ADULT - HOME CARE AGENCY
[facility]  for home PT services [phone] [facility] home [facility]  services [phone]

## 2017-12-15 NOTE — DISCHARGE NOTE ADULT - HOSPITAL COURSE
89 year old RH  female with DM type 2, HTN, HLD presents with unsteady gait, AMS since 12/9 with new facial droop in the setting of treatment with Bactrim for acute UTI with negative CT head and neurologic exam with right lower facial droop that appears to be chronic/worsening due to swelling that developed promptly after starting Bactrim.   Per neurologist CVA unlikely in setting of no neurologic deficits on exam. More likely that AMS/hallucinations are secondary to bactrim/UTI. Mental status improved and is back to baseline.  Per Infectious diseases - AMS with likely bactrim induced CNS side effects. No s/s at this time to suggest infection.     Patient being worked up for endometrial mass as outpatient- Endometrial bx is done out pt.    Mental status improved and is medically cleared for discharge with follow up by PMD - Dr. Fontana and GYN. 89 year old RH  female with DM type 2, HTN, HLD presents with unsteady gait, AMS since 12/9 with new facial droop in the setting of treatment with Bactrim for acute UTI with negative CT head and neurologic exam with right lower facial droop that appears to be chronic/worsening due to swelling that developed promptly after starting Bactrim.   Per neurologist CVA unlikely in setting of no neurologic deficits on exam. More likely that AMS/hallucinations are secondary to bactrim/UTI. Mental status improved and is back to baseline.  Per Infectious diseases - AMS with likely bactrim induced CNS side effects. No s/s at this time to suggest infection.     Patient being worked up for endometrial mass as outpatient- Endometrial bx is done out pt by Dr. Larkin    Mental status improved and is medically cleared for discharge with follow up by PMD - Dr. Fontana and GYN - Dr. Larkin in 1 week - call for appointment

## 2017-12-15 NOTE — DISCHARGE NOTE ADULT - PLAN OF CARE
Resolved More likely that AMS/hallucinations are secondary to bactrim/UTI. Mental status improved and is back to baseline. Patient being worked up for endometrial mass as outpatient- Endometrial bx is done out pt by Dr. Larkin Follow up with PMD in 1 week - call for appointment  HgA1C this admission - 6.3  Make sure you get your HgA1c checked every three months.  If you take oral diabetes medications, check your blood glucose two times a day.  It's important not to skip any meals.  Keep a log of your blood glucose results and always take it with you to your doctor appointments.  Keep a list of your current medications including injectables and over the counter medications and bring this medication list with you to all your doctor appointments.  If you have not seen your ophthalmologist this year call for appointment.  Check your feet daily for redness, sores, or openings. Do not self treat. If no improvement in two days call your primary care physician for an appointment.  Low blood sugar (hypoglycemia) is a blood sugar below 70mg/dl. Check your blood sugar if you feel signs/symptoms of hypoglycemia. If your blood sugar is below 70 take 15 grams of carbohydrates (ex 4 oz of apple juice, 3-4 glucose tablets, or 4-6 oz of regular soda) wait 15 minutes and repeat blood sugar to make sure it comes up above 70.  If your blood sugar is above 70 and you are due for a meal, have a meal.  If you are not due for a meal have a snack.  This snack helps keeps your blood sugar at a safe range. Take medications for your blood pressure as recommended.  Eat a heart healthy diet that is low in saturated fats and salt, and includes whole grains, fruits, vegetables and lean protein   Exercise regularly (consult with your physician or cardiologist first); maintain a heart healthy weight.   If you smoke - quit (A resource to help you stop smoking is the [facility] – phone number 817-338-2644.). Continue to follow with your primary physician or cardiologist.   Seek medical help for dizziness, Lightheadedness, Blurry vision, Headache, Chest pain, Shortness of breath  Follow up with your medical doctor to establish long term blood pressure treatment goals. Continue with your cholesterol medications. Eat a heart healthy diet that is low in saturated fats and salt, and includes whole grains, fruits, vegetables and lean protein; exercise regularly (consult with your physician or cardiologist first); maintain a heart healthy weight; if you smoke - quit (A resource to help you stop smoking is the [facility] – phone number 268-276-9285.). Continue to follow with your primary physician or cardiologist.  Seek medical help for dizziness, Lightheadedness, Blurry vision, Headache, Chest pain, Shortness of breath

## 2017-12-15 NOTE — DISCHARGE NOTE ADULT - MEDICATION SUMMARY - MEDICATIONS TO TAKE
I will START or STAY ON the medications listed below when I get home from the hospital:    l-methylfolate with b6-12  -- 1 tab(s) by mouth once a day  -- Indication: For supplement    calcium 1200mg  -- 1 tab(s) by mouth once a day  -- Indication: For supplement    acetaminophen 325 mg oral tablet  -- 2 tab(s) by mouth 2 times a day, As Needed -Pain  -- Indication: For pain    escitalopram 5 mg oral tablet  -- 1 tab(s) by mouth once a day  -- Indication: For Depression    metFORMIN 500 mg oral tablet  -- 1 tab(s) by mouth 2 times a day  -- Indication: For Blood sugar control    Toprol-XL 25 mg oral tablet, extended release  -- 1 tab(s) by mouth once a day  -- Indication: For Blood pressure control    amLODIPine 5 mg oral tablet  -- 1 tab(s) by mouth once a day  -- Indication: For Blood sugar control    biotin 5000 mcg oral tablet, disintegrating  -- 1 tab(s) by mouth once a day  -- Indication: For Supplement    Vitamin D3 2000 intl units oral capsule  -- 1 cap(s) by mouth every other day  -- Indication: For supplement    vitamin E 400 intl units oral capsule  -- 1 cap(s) by mouth once a day  -- Indication: For supplement

## 2017-12-15 NOTE — PROGRESS NOTE ADULT - ASSESSMENT
89yo lady with DM went for evaluation of vaginal bleed and was found to have UTI. She was given Bactrim DS 1 BID x 7 days starting 12-1-17. She began hallucinating and walking unsteadily. She came to see me on 12-11-17 and then sent to our ER. Bactrim was d/c'd  CT head was neg.    Acute kidney injury;   UTI; resolved  Unsteady gait ( new): No more w/u, home PT.   pelvic US reportno I put into her drawer: NP contacted GYN dept, Endometrial bx is done out pt. Will tell daughter.   D/C home with home PT.

## 2017-12-15 NOTE — PROGRESS NOTE ADULT - ATTENDING COMMENTS
[facility] NEPHROLOGY  Reynaldo Chakraborty M.D.  Brandon Magaña D.O.  Rhonda Agee M.D.  Rea Samuels, MSN, ANP-C    Telephone: [phone]  Facsimile: [phone]    [address] [place] [address]
[facility] NEPHROLOGY  Reynaldo Chakraborty M.D.  Brandon Magaña D.O.  Rhonda Agee M.D.  Rea Samuels, MSN, ANP-C    Telephone: [phone]  Facsimile: [phone]    [address] [place] [address]
Ruben Morfin  Attending Physician   Division of Infectious Disease  Pager #[phone]  After 5pm/weekend #[phone]
Ruben Morfin  Attending Physician   Division of Infectious Disease  Pager #[phone]  After 5pm/weekend #[phone]    Will sign off, recall ID if needed #[phone].

## 2017-12-15 NOTE — DISCHARGE NOTE ADULT - CARE PLAN
Principal Discharge DX:	Metabolic encephalopathy  Secondary Diagnosis:	SAVAGE (acute kidney injury)  Secondary Diagnosis:	Diabetes mellitus type II, controlled  Secondary Diagnosis:	Hypercholesteremia  Secondary Diagnosis:	Hypertension, essential, benign Principal Discharge DX:	Metabolic encephalopathy  Goal:	Resolved  Instructions for follow-up, activity and diet:	More likely that AMS/hallucinations are secondary to bactrim/UTI. Mental status improved and is back to baseline.  Secondary Diagnosis:	SAVAGE (acute kidney injury)  Instructions for follow-up, activity and diet:	Resolved  Secondary Diagnosis:	Uterine mass  Instructions for follow-up, activity and diet:	Patient being worked up for endometrial mass as outpatient- Endometrial bx is done out pt by Dr. Larkin  Secondary Diagnosis:	Type 2 diabetes mellitus without complication, without long-term current use of insulin  Instructions for follow-up, activity and diet:	Follow up with PMD in 1 week - call for appointment  HgA1C this admission - 6.3  Make sure you get your HgA1c checked every three months.  If you take oral diabetes medications, check your blood glucose two times a day.  It's important not to skip any meals.  Keep a log of your blood glucose results and always take it with you to your doctor appointments.  Keep a list of your current medications including injectables and over the counter medications and bring this medication list with you to all your doctor appointments.  If you have not seen your ophthalmologist this year call for appointment.  Check your feet daily for redness, sores, or openings. Do not self treat. If no improvement in two days call your primary care physician for an appointment.  Low blood sugar (hypoglycemia) is a blood sugar below 70mg/dl. Check your blood sugar if you feel signs/symptoms of hypoglycemia. If your blood sugar is below 70 take 15 grams of carbohydrates (ex 4 oz of apple juice, 3-4 glucose tablets, or 4-6 oz of regular soda) wait 15 minutes and repeat blood sugar to make sure it comes up above 70.  If your blood sugar is above 70 and you are due for a meal, have a meal.  If you are not due for a meal have a snack.  This snack helps keeps your blood sugar at a safe range.  Secondary Diagnosis:	Hypertension, essential, benign  Instructions for follow-up, activity and diet:	Take medications for your blood pressure as recommended.  Eat a heart healthy diet that is low in saturated fats and salt, and includes whole grains, fruits, vegetables and lean protein   Exercise regularly (consult with your physician or cardiologist first); maintain a heart healthy weight.   If you smoke - quit (A resource to help you stop smoking is the [facility] – phone number [phone].). Continue to follow with your primary physician or cardiologist.   Seek medical help for dizziness, Lightheadedness, Blurry vision, Headache, Chest pain, Shortness of breath  Follow up with your medical doctor to establish long term blood pressure treatment goals.  Secondary Diagnosis:	Hypercholesteremia  Instructions for follow-up, activity and diet:	Continue with your cholesterol medications. Eat a heart healthy diet that is low in saturated fats and salt, and includes whole grains, fruits, vegetables and lean protein; exercise regularly (consult with your physician or cardiologist first); maintain a heart healthy weight; if you smoke - quit (A resource to help you stop smoking is the [facility] – phone number [phone].). Continue to follow with your primary physician or cardiologist.  Seek medical help for dizziness, Lightheadedness, Blurry vision, Headache, Chest pain, Shortness of breath

## 2017-12-15 NOTE — PROGRESS NOTE ADULT - PROBLEM SELECTOR PLAN 1
Patient with elevated Scr suspect likely hemodynamically mediated now resolving (baseline 0.6-0.8). Defer further work up. Avoid nephrotoxins.
Patient with elevated Scr suspect likely hemodynamically mediated. Follow up repeat Scr (currently pending) and if improving towards baseline (0.6-0.8), would defer further work up. If not improved, will check urine studies and bladder scan. Avoid nephrotoxins.
-stable  -monitor off abx
-stable  -monitor off abx  -no s/s at this time to suggest infection

## 2017-12-15 NOTE — PROGRESS NOTE ADULT - RS GEN PE MLT RESP DETAILS PC
clear to auscultation bilaterally
respirations non-labored/clear to auscultation bilaterally/good air movement/breath sounds equal
respirations non-labored/clear to auscultation bilaterally/good air movement
respirations non-labored/clear to auscultation bilaterally/good air movement

## 2017-12-15 NOTE — DISCHARGE NOTE ADULT - SECONDARY DIAGNOSIS.
SAVAGE (acute kidney injury) Diabetes mellitus type II, controlled Hypercholesteremia Hypertension, essential, benign Uterine mass Type 2 diabetes mellitus without complication, without long-term current use of insulin

## 2017-12-15 NOTE — PROGRESS NOTE ADULT - PROBLEM SELECTOR PLAN 2
BP improved off of IVF with isolated elevated reading. Continue with current medications and low sodium diet. Monitor BP.

## 2017-12-15 NOTE — PROGRESS NOTE ADULT - PROBLEM SELECTOR PROBLEM 1
Acute kidney injury
Acute kidney injury
Antibiotics causing adverse effect in therapeutic use, initial encounter
Antibiotics causing adverse effect in therapeutic use, initial encounter

## 2017-12-15 NOTE — PROGRESS NOTE ADULT - CVS HE PE MLT D E PC
no rub/regular rate and rhythm
regular rate and rhythm/no rub
no rub/regular rate and rhythm
regular rate and rhythm/no rub

## 2017-12-15 NOTE — PROGRESS NOTE ADULT - MS EXT PE MLT D E PC
no pedal edema
no cyanosis/no pedal edema
no cyanosis/no pedal edema
no clubbing/no cyanosis/no pedal edema
no cyanosis/no pedal edema
no pedal edema

## 2017-12-15 NOTE — DISCHARGE NOTE ADULT - MEDICATION SUMMARY - MEDICATIONS TO CHANGE
I will SWITCH the dose or number of times a day I take the medications listed below when I get home from the hospital:    metoprolol tartrate 25 mg oral tablet  -- 1 tab(s) by mouth every 12 hours

## 2017-12-19 PROCEDURE — 82803 BLOOD GASES ANY COMBINATION: CPT

## 2017-12-19 PROCEDURE — 87186 SC STD MICRODIL/AGAR DIL: CPT

## 2017-12-19 PROCEDURE — 84295 ASSAY OF SERUM SODIUM: CPT

## 2017-12-19 PROCEDURE — 82330 ASSAY OF CALCIUM: CPT

## 2017-12-19 PROCEDURE — 87086 URINE CULTURE/COLONY COUNT: CPT

## 2017-12-19 PROCEDURE — 83735 ASSAY OF MAGNESIUM: CPT

## 2017-12-19 PROCEDURE — 85730 THROMBOPLASTIN TIME PARTIAL: CPT

## 2017-12-19 PROCEDURE — 70450 CT HEAD/BRAIN W/O DYE: CPT

## 2017-12-19 PROCEDURE — 84443 ASSAY THYROID STIM HORMONE: CPT

## 2017-12-19 PROCEDURE — 84484 ASSAY OF TROPONIN QUANT: CPT

## 2017-12-19 PROCEDURE — 82947 ASSAY GLUCOSE BLOOD QUANT: CPT

## 2017-12-19 PROCEDURE — 85014 HEMATOCRIT: CPT

## 2017-12-19 PROCEDURE — 99291 CRITICAL CARE FIRST HOUR: CPT | Mod: 25

## 2017-12-19 PROCEDURE — 80053 COMPREHEN METABOLIC PANEL: CPT

## 2017-12-19 PROCEDURE — 83605 ASSAY OF LACTIC ACID: CPT

## 2017-12-19 PROCEDURE — 84132 ASSAY OF SERUM POTASSIUM: CPT

## 2017-12-19 PROCEDURE — 85610 PROTHROMBIN TIME: CPT

## 2017-12-19 PROCEDURE — 71045 X-RAY EXAM CHEST 1 VIEW: CPT

## 2017-12-19 PROCEDURE — 82553 CREATINE MB FRACTION: CPT

## 2017-12-19 PROCEDURE — 83036 HEMOGLOBIN GLYCOSYLATED A1C: CPT

## 2017-12-19 PROCEDURE — 82962 GLUCOSE BLOOD TEST: CPT

## 2017-12-19 PROCEDURE — 82746 ASSAY OF FOLIC ACID SERUM: CPT

## 2017-12-19 PROCEDURE — 85027 COMPLETE CBC AUTOMATED: CPT

## 2017-12-19 PROCEDURE — 82435 ASSAY OF BLOOD CHLORIDE: CPT

## 2017-12-19 PROCEDURE — 76770 US EXAM ABDO BACK WALL COMP: CPT

## 2017-12-19 PROCEDURE — 80061 LIPID PANEL: CPT

## 2017-12-19 PROCEDURE — 82550 ASSAY OF CK (CPK): CPT

## 2017-12-19 PROCEDURE — 93005 ELECTROCARDIOGRAM TRACING: CPT

## 2017-12-19 PROCEDURE — 81001 URINALYSIS AUTO W/SCOPE: CPT

## 2017-12-19 PROCEDURE — 80048 BASIC METABOLIC PNL TOTAL CA: CPT

## 2017-12-19 PROCEDURE — 86780 TREPONEMA PALLIDUM: CPT

## 2017-12-19 PROCEDURE — 82607 VITAMIN B-12: CPT

## 2017-12-19 PROCEDURE — 97161 PT EVAL LOW COMPLEX 20 MIN: CPT

## 2018-01-09 ENCOUNTER — APPOINTMENT (OUTPATIENT)
Dept: INFECTIOUS DISEASE | Facility: CLINIC | Age: 83
End: 2018-01-09
Payer: MEDICARE

## 2018-01-09 VITALS
BODY MASS INDEX: 12.6 KG/M2 | WEIGHT: 88 LBS | TEMPERATURE: 98.6 F | HEIGHT: 70 IN | RESPIRATION RATE: 16 BRPM | HEART RATE: 58 BPM | OXYGEN SATURATION: 97 % | SYSTOLIC BLOOD PRESSURE: 142 MMHG | DIASTOLIC BLOOD PRESSURE: 72 MMHG

## 2018-01-09 DIAGNOSIS — Z86.39 PERSONAL HISTORY OF OTHER ENDOCRINE, NUTRITIONAL AND METABOLIC DISEASE: ICD-10-CM

## 2018-01-09 DIAGNOSIS — Z87.19 PERSONAL HISTORY OF OTHER DISEASES OF THE DIGESTIVE SYSTEM: ICD-10-CM

## 2018-01-09 DIAGNOSIS — Z86.79 PERSONAL HISTORY OF OTHER DISEASES OF THE CIRCULATORY SYSTEM: ICD-10-CM

## 2018-01-09 DIAGNOSIS — N39.0 URINARY TRACT INFECTION, SITE NOT SPECIFIED: ICD-10-CM

## 2018-01-09 PROCEDURE — 99204 OFFICE O/P NEW MOD 45 MIN: CPT

## 2018-04-15 NOTE — DISCHARGE NOTE ADULT - CARE PROVIDER_API CALL
Clothing Beverly Fontana), Medical Oncology  [facility] [address] [place] [address] [address]  [place] [address]  Phone: [phone]  Fax: [phone]

## 2018-04-22 ENCOUNTER — EMERGENCY (EMERGENCY)
Facility: HOSPITAL | Age: 83
LOS: 1 days | Discharge: HOME | End: 2018-04-22
Attending: EMERGENCY MEDICINE
Payer: MEDICARE

## 2018-04-22 VITALS
HEIGHT: 58 IN | SYSTOLIC BLOOD PRESSURE: 194 MMHG | WEIGHT: 91.93 LBS | TEMPERATURE: 99 F | OXYGEN SATURATION: 97 % | RESPIRATION RATE: 20 BRPM | DIASTOLIC BLOOD PRESSURE: 77 MMHG | HEART RATE: 64 BPM

## 2018-04-22 PROCEDURE — 72125 CT NECK SPINE W/O DYE: CPT | Mod: 26

## 2018-04-22 PROCEDURE — 99284 EMERGENCY DEPT VISIT MOD MDM: CPT | Mod: 25,GC

## 2018-04-22 PROCEDURE — 70450 CT HEAD/BRAIN W/O DYE: CPT | Mod: 26

## 2018-04-22 PROCEDURE — 93010 ELECTROCARDIOGRAM REPORT: CPT

## 2018-04-22 RX ORDER — TETANUS TOXOID, REDUCED DIPHTHERIA TOXOID AND ACELLULAR PERTUSSIS VACCINE, ADSORBED 5; 2.5; 8; 8; 2.5 [IU]/.5ML; [IU]/.5ML; UG/.5ML; UG/.5ML; UG/.5ML
0.5 SUSPENSION INTRAMUSCULAR ONCE
Qty: 0 | Refills: 0 | Status: COMPLETED | OUTPATIENT
Start: 2018-04-22 | End: 2018-04-22

## 2018-04-22 RX ORDER — ACETAMINOPHEN 500 MG
1000 TABLET ORAL ONCE
Qty: 0 | Refills: 0 | Status: COMPLETED | OUTPATIENT
Start: 2018-04-22 | End: 2018-04-23

## 2018-04-22 NOTE — ED PROVIDER NOTE - PROGRESS NOTE DETAILS
Head hematoma washed out, no lacerations, just circular ecchymosis with central elevation, small abrasions with oozing that appears to be stopped. at that time. KIANA De La Rosa PGY1 Patient road tested able to walk, will d/c home with her daughters. SWATHI Urrutia, KIANA PGY1

## 2018-04-22 NOTE — ED PROVIDER NOTE - CARE PLAN
Principal Discharge DX:	Scalp hematoma, initial encounter  Secondary Diagnosis:	Closed head injury  Secondary Diagnosis:	UTI (urinary tract infection)

## 2018-04-22 NOTE — ED PROVIDER NOTE - PHYSICAL EXAMINATION
Attending MD Trejo: A & O x 3, GCS 15, NAD, pleasant, +3cm x 2cm left parietal hematoma with dried blood overlying hematoma, HEENT WNL and no facial asymmetry; nontender spine; lungs CTAB with no chest wall trauma or TTP, heart with reg rhythm without murmur; abdomen soft NTND with no R/G; extremities with no edema/deformities; painless PROM all upper and lower extremity joints,  skin with no rashes, neuro exam non focal with no motor or sensory deficits and patient is moving all extremities spontaneously. Gen: NAD, non-toxic, conversational  Eyes: PERRLA, EOMI   HENT: Normocephalic, with ~3x2.5 cm scalp hematoma left parietal area with dried blood in hair, no epistaxis, no battles sign, no raccoon eyes. External ears normal, no rhinorrhea, moist mucous membranes.   CV: RRR, no M/R/G  Resp: CTAB, non-labored, speaking without difficulty on room air  Abd: soft, non tender, non rigid, no guarding or rebound tenderness  Back: No CVAT bilaterally, no midline ttp  Skin: dry, wwp   Neuro: AOx3, speech is fluent and appropriate  Psych: Mood okay, affect euthymic     Attending MD Maya: A & O x 3, GCS 15, NAD, pleasant, +3cm x 2cm left parietal hematoma with dried blood overlying hematoma, HEENT WNL and no facial asymmetry; nontender spine; lungs CTAB with no chest wall trauma or TTP, heart with reg rhythm without murmur; abdomen soft NTND with no R/G; extremities with no edema/deformities; painless PROM all upper and lower extremity joints,  skin with no rashes, neuro exam non focal with no motor or sensory deficits and patient is moving all extremities spontaneously. Gen: NAD, non-toxic, conversational  Eyes: PERRLA, EOMI   HENT: Normocephalic, with ~3x4 cm scalp hematoma left parietal area with dried blood in hair, no epistaxis, no battles sign, no raccoon eyes. External ears normal, no rhinorrhea, moist mucous membranes.   CV: RRR, no M/R/G  Resp: CTAB, non-labored, speaking without difficulty on room air  Abd: soft, non tender, non rigid, no guarding or rebound tenderness  Back: No CVAT bilaterally, no midline ttp  Skin: dry, wwp   Neuro: AOx3, speech is fluent and appropriate  Psych: Mood okay, affect euthymic     Attending MD Maya: A & O x 3, GCS 15, NAD, pleasant, +3cm x 2cm left parietal hematoma with dried blood overlying hematoma, HEENT WNL and no facial asymmetry; nontender spine; lungs CTAB with no chest wall trauma or TTP, heart with reg rhythm without murmur; abdomen soft NTND with no R/G; extremities with no edema/deformities; painless PROM all upper and lower extremity joints,  skin with no rashes, neuro exam non focal with no motor or sensory deficits and patient is moving all extremities spontaneously.

## 2018-04-22 NOTE — ED PROVIDER NOTE - MEDICAL DECISION MAKING DETAILS
Attending MD Trejo: 89F with fall from standing and resultant head trauma, +hematoma left parietal scalp, plan for CT head, C spine, labs, UA, EKG CXR to eval for metabolic or infectious precipitant for fall, update tetanus, irrigate hematoma to eval for laceration that may require primary repair Attending MD Trejo: 89F with fall from standing and resultant head trauma, +hematoma left parietal scalp, plan for CT head, C spine, labs, UA, EKG CXR to eval for metabolic or infectious precipitant for fall, update tetanus, irrigate hematoma to eval for laceration that may require primary repair    KIANA De La Rosa PGY1: 89F hx HTN, HLD, DM, depression presenting for evaluation s/p mechanical fall without syncope complaining of headache. Will evaluate with pre-ops, imaging; apap for pain control, f/u studies, likely will require sutures vs staples for hematoma / lac vs abrasion, will assess s/p CT. KIANA De La Rosa PGY1

## 2018-04-22 NOTE — ED PROVIDER NOTE - OBJECTIVE STATEMENT
Hx DM, HTN, HLD, and depression lives at home alone, ambulatory with walker / cane, was sitting down into a chair today when she missed and fell backwards, striking her head on the floor. She did not lose consciousness and was able to get back up, walk to her phone and call her daughter who came to see her. At that time she found her to have a large hematoma of the left posterior scalp and became concerned as it had ongoing bleeding, subsequently she brought her here to the ED for further evaluation. She is not currently on any blood thinners.

## 2018-04-22 NOTE — ED PROVIDER NOTE - ATTENDING CONTRIBUTION TO CARE
Attending MD Trejo:  I personally have seen and examined this patient.  Resident note reviewed and agree on plan of care and except where noted.  See HPI, PE, and MDM for details.

## 2018-04-23 VITALS
RESPIRATION RATE: 20 BRPM | DIASTOLIC BLOOD PRESSURE: 78 MMHG | SYSTOLIC BLOOD PRESSURE: 178 MMHG | OXYGEN SATURATION: 96 % | TEMPERATURE: 98 F | HEART RATE: 62 BPM

## 2018-04-23 LAB
ALBUMIN SERPL ELPH-MCNC: 4.4 G/DL — SIGNIFICANT CHANGE UP (ref 3.3–5)
ALP SERPL-CCNC: 45 U/L — SIGNIFICANT CHANGE UP (ref 40–120)
ALT FLD-CCNC: 17 U/L — SIGNIFICANT CHANGE UP (ref 10–45)
ANION GAP SERPL CALC-SCNC: 17 MMOL/L — SIGNIFICANT CHANGE UP (ref 5–17)
APPEARANCE UR: CLEAR — SIGNIFICANT CHANGE UP
APTT BLD: 27.3 SEC — LOW (ref 27.5–37.4)
AST SERPL-CCNC: 26 U/L — SIGNIFICANT CHANGE UP (ref 10–40)
BACTERIA # UR AUTO: ABNORMAL /HPF
BASOPHILS # BLD AUTO: 0 K/UL — SIGNIFICANT CHANGE UP (ref 0–0.2)
BASOPHILS NFR BLD AUTO: 0.2 % — SIGNIFICANT CHANGE UP (ref 0–2)
BILIRUB SERPL-MCNC: 0.3 MG/DL — SIGNIFICANT CHANGE UP (ref 0.2–1.2)
BILIRUB UR-MCNC: NEGATIVE — SIGNIFICANT CHANGE UP
BLD GP AB SCN SERPL QL: NEGATIVE — SIGNIFICANT CHANGE UP
BUN SERPL-MCNC: 26 MG/DL — HIGH (ref 7–23)
CALCIUM SERPL-MCNC: 9.9 MG/DL — SIGNIFICANT CHANGE UP (ref 8.4–10.5)
CHLORIDE SERPL-SCNC: 97 MMOL/L — SIGNIFICANT CHANGE UP (ref 96–108)
CO2 SERPL-SCNC: 21 MMOL/L — LOW (ref 22–31)
COLOR SPEC: SIGNIFICANT CHANGE UP
COMMENT - URINE: SIGNIFICANT CHANGE UP
CREAT SERPL-MCNC: 0.84 MG/DL — SIGNIFICANT CHANGE UP (ref 0.5–1.3)
DIFF PNL FLD: ABNORMAL
EOSINOPHIL # BLD AUTO: 0.1 K/UL — SIGNIFICANT CHANGE UP (ref 0–0.5)
EOSINOPHIL NFR BLD AUTO: 1.5 % — SIGNIFICANT CHANGE UP (ref 0–6)
EPI CELLS # UR: SIGNIFICANT CHANGE UP /HPF
GLUCOSE SERPL-MCNC: 150 MG/DL — HIGH (ref 70–99)
GLUCOSE UR QL: NEGATIVE — SIGNIFICANT CHANGE UP
HCT VFR BLD CALC: 40.4 % — SIGNIFICANT CHANGE UP (ref 34.5–45)
HGB BLD-MCNC: 13.9 G/DL — SIGNIFICANT CHANGE UP (ref 11.5–15.5)
HYALINE CASTS # UR AUTO: ABNORMAL
INR BLD: 0.95 RATIO — SIGNIFICANT CHANGE UP (ref 0.88–1.16)
KETONES UR-MCNC: NEGATIVE — SIGNIFICANT CHANGE UP
LEUKOCYTE ESTERASE UR-ACNC: ABNORMAL
LYMPHOCYTES # BLD AUTO: 1.7 K/UL — SIGNIFICANT CHANGE UP (ref 1–3.3)
LYMPHOCYTES # BLD AUTO: 19.2 % — SIGNIFICANT CHANGE UP (ref 13–44)
MCHC RBC-ENTMCNC: 33.6 PG — SIGNIFICANT CHANGE UP (ref 27–34)
MCHC RBC-ENTMCNC: 34.4 GM/DL — SIGNIFICANT CHANGE UP (ref 32–36)
MCV RBC AUTO: 97.7 FL — SIGNIFICANT CHANGE UP (ref 80–100)
MONOCYTES # BLD AUTO: 0.8 K/UL — SIGNIFICANT CHANGE UP (ref 0–0.9)
MONOCYTES NFR BLD AUTO: 9.4 % — SIGNIFICANT CHANGE UP (ref 2–14)
NEUTROPHILS # BLD AUTO: 6 K/UL — SIGNIFICANT CHANGE UP (ref 1.8–7.4)
NEUTROPHILS NFR BLD AUTO: 69.6 % — SIGNIFICANT CHANGE UP (ref 43–77)
NITRITE UR-MCNC: NEGATIVE — SIGNIFICANT CHANGE UP
PH UR: 6 — SIGNIFICANT CHANGE UP (ref 5–8)
PLATELET # BLD AUTO: 265 K/UL — SIGNIFICANT CHANGE UP (ref 150–400)
POTASSIUM SERPL-MCNC: 4.9 MMOL/L — SIGNIFICANT CHANGE UP (ref 3.5–5.3)
POTASSIUM SERPL-SCNC: 4.9 MMOL/L — SIGNIFICANT CHANGE UP (ref 3.5–5.3)
PROT SERPL-MCNC: 8.1 G/DL — SIGNIFICANT CHANGE UP (ref 6–8.3)
PROT UR-MCNC: SIGNIFICANT CHANGE UP
PROTHROM AB SERPL-ACNC: 10.3 SEC — SIGNIFICANT CHANGE UP (ref 9.8–12.7)
RBC # BLD: 4.13 M/UL — SIGNIFICANT CHANGE UP (ref 3.8–5.2)
RBC # FLD: 11.6 % — SIGNIFICANT CHANGE UP (ref 10.3–14.5)
RBC CASTS # UR COMP ASSIST: ABNORMAL /HPF (ref 0–2)
RH IG SCN BLD-IMP: POSITIVE — SIGNIFICANT CHANGE UP
RH IG SCN BLD-IMP: POSITIVE — SIGNIFICANT CHANGE UP
SODIUM SERPL-SCNC: 135 MMOL/L — SIGNIFICANT CHANGE UP (ref 135–145)
SP GR SPEC: 1.01 — SIGNIFICANT CHANGE UP (ref 1.01–1.02)
UROBILINOGEN FLD QL: NEGATIVE — SIGNIFICANT CHANGE UP
WBC # BLD: 8.6 K/UL — SIGNIFICANT CHANGE UP (ref 3.8–10.5)
WBC # FLD AUTO: 8.6 K/UL — SIGNIFICANT CHANGE UP (ref 3.8–10.5)
WBC UR QL: >50 /HPF (ref 0–5)

## 2018-04-23 PROCEDURE — 90715 TDAP VACCINE 7 YRS/> IM: CPT

## 2018-04-23 PROCEDURE — 96375 TX/PRO/DX INJ NEW DRUG ADDON: CPT

## 2018-04-23 PROCEDURE — 93005 ELECTROCARDIOGRAM TRACING: CPT

## 2018-04-23 PROCEDURE — 90471 IMMUNIZATION ADMIN: CPT

## 2018-04-23 PROCEDURE — 85610 PROTHROMBIN TIME: CPT

## 2018-04-23 PROCEDURE — 96374 THER/PROPH/DIAG INJ IV PUSH: CPT

## 2018-04-23 PROCEDURE — 85730 THROMBOPLASTIN TIME PARTIAL: CPT

## 2018-04-23 PROCEDURE — 87186 SC STD MICRODIL/AGAR DIL: CPT

## 2018-04-23 PROCEDURE — 71045 X-RAY EXAM CHEST 1 VIEW: CPT | Mod: 26

## 2018-04-23 PROCEDURE — 99284 EMERGENCY DEPT VISIT MOD MDM: CPT | Mod: 25

## 2018-04-23 PROCEDURE — 85027 COMPLETE CBC AUTOMATED: CPT

## 2018-04-23 PROCEDURE — 72125 CT NECK SPINE W/O DYE: CPT

## 2018-04-23 PROCEDURE — 80053 COMPREHEN METABOLIC PANEL: CPT

## 2018-04-23 PROCEDURE — 86901 BLOOD TYPING SEROLOGIC RH(D): CPT

## 2018-04-23 PROCEDURE — 86850 RBC ANTIBODY SCREEN: CPT

## 2018-04-23 PROCEDURE — 81001 URINALYSIS AUTO W/SCOPE: CPT

## 2018-04-23 PROCEDURE — 86900 BLOOD TYPING SEROLOGIC ABO: CPT

## 2018-04-23 PROCEDURE — 87086 URINE CULTURE/COLONY COUNT: CPT

## 2018-04-23 PROCEDURE — 71045 X-RAY EXAM CHEST 1 VIEW: CPT

## 2018-04-23 PROCEDURE — 70450 CT HEAD/BRAIN W/O DYE: CPT

## 2018-04-23 RX ORDER — CEFUROXIME AXETIL 250 MG
1 TABLET ORAL
Qty: 14 | Refills: 0 | OUTPATIENT
Start: 2018-04-23 | End: 2018-04-29

## 2018-04-23 RX ORDER — CEFTRIAXONE 500 MG/1
1 INJECTION, POWDER, FOR SOLUTION INTRAMUSCULAR; INTRAVENOUS ONCE
Qty: 0 | Refills: 0 | Status: COMPLETED | OUTPATIENT
Start: 2018-04-23 | End: 2018-04-23

## 2018-04-23 RX ADMIN — Medication 1000 MILLIGRAM(S): at 00:55

## 2018-04-23 RX ADMIN — Medication 400 MILLIGRAM(S): at 00:25

## 2018-04-23 RX ADMIN — TETANUS TOXOID, REDUCED DIPHTHERIA TOXOID AND ACELLULAR PERTUSSIS VACCINE, ADSORBED 0.5 MILLILITER(S): 5; 2.5; 8; 8; 2.5 SUSPENSION INTRAMUSCULAR at 00:27

## 2018-04-23 RX ADMIN — CEFTRIAXONE 100 GRAM(S): 500 INJECTION, POWDER, FOR SOLUTION INTRAMUSCULAR; INTRAVENOUS at 01:01

## 2018-04-23 NOTE — ED ADULT NURSE REASSESSMENT NOTE - NS ED NURSE REASSESS COMMENT FT1
Pt ambulated through the hallway with walker and nurse at [facility] for assistance. Pt denies shortness of breath, dizziness, numbness/tingling, or any other symptoms at this time.

## 2018-04-23 NOTE — ED ADULT NURSE NOTE - ADDITIONAL LOCATION
Patient Information     Patient Name MRJuana Lennon [id number] Female 1963      Telephone Encounter by Ellie Hamm RN at 2017 11:19 AM     Author:  Ellie Hamm RN Service:  (none) Author Type:  (none)     Filed:  2017 11:27 AM Encounter Date:  2017 Status:  Signed     :  Ellie Hamm RN (NURS- Registered Nurse)            Depression-in adults 18 and over  Serotonin/Norepinephrine Reuptake Inhibitors    Office visit in the past 12 months or as indicated in chart.  Should have clinic visit 1-2 months after initial prescription.    Last visit with SHELBI GREEN was on: 2016 in [facility]  Next visit with SHELBI GREEN is on: No future appointment listed with this provider  Next visit with Family Practice is on: No future appointment listed in this department    Max refills 12 months from last office visit or per providers notes.    Diuretic Combinations    Office visit in the past 12 months or per provider note.    Last visit with SHELBI GREEN was on: 2016 in [facility]  Next visit with SHELBI GREEN is on: No future appointment listed with this provider  Next visit with Family Practice is on: No future appointment listed in this department    Lab test requirements:  Creatinine and Potassium annually, if ordering lab, order BMP.  CREATININE (mg/dL)    Date Value   2016 0.79     POTASSIUM (mmol/L)    Date Value   2016 4.0       Max refill for 12 months from last office visit or per provider note.    Left message for patient to call back - patient needs to be seen for further refills. Labs are overdue. Will give short supply until patient can be seen AFTER making appointment.     Left message to call back  ....................Ellie Hamm RN  2017   11:27 AM               additional location...

## 2018-04-23 NOTE — ED ADULT NURSE NOTE - CHPI ED SYMPTOMS NEG
no loss of consciousness/no weakness/no vomiting/no confusion/no deformity/no tingling/no fever/no numbness

## 2018-04-23 NOTE — ED ADULT NURSE NOTE - OBJECTIVE STATEMENT
89 YO female with PMH HTN, HLD, DM, and depression via walk in presenting with complaints of pain and laceration sp fall. As per patient's family, pt had been walking with a walker when she went to sit and missed the chair and sustained a laceration to the left occipit of her head, as well as pain to the left arm. Pt reports pain is 7/10, described as aching, worsened by movement. Family reports patient had been exhibiting signs of confusion over the last week.   Pt Axox4, gross neuro intact, PERRL mm. Lungs clear throughout bilateral. S1S2 heard. Abdomen soft, non-tender, non-distended. Skin warm, dry, and intact. Safety and comfort measures maintained. family present at bedside. 89 YO female with PMH HTN, HLD, DM, and depression via walk in presenting with complaints of pain and laceration sp fall. As per patient's family, pt had been walking with a walker when she went to sit and missed the chair and sustained a laceration to the left occipit of her head, as well as pain to the left arm. Pt reports pain is 7/10, described as aching, worsened by movement. Family reports patient had been exhibiting signs of confusion over the last week.   Pt Axox4, gross neuro intact, PERRL 3 mm. Lungs clear throughout bilateral. S1S2 heard. Abdomen soft, non-tender, non-distended. Skin warm, dry, and intact. Safety and comfort measures maintained. family present at bedside.

## 2018-04-26 NOTE — ED POST DISCHARGE NOTE - OTHER COMMUNICATION
Patient d/c with prescription for ceftin. Final culture results show pansensitivity to cephalosporins. - Katelin Siegel PA-C

## 2018-10-22 NOTE — ED PROVIDER NOTE - NS_EDPROVIDERDISPOUSERTYPE_ED_A_ED
Ear Canal Irrigation Discharge  Take full course of antibiotic ear drops as prescribed.  Avoid cleaning ears with any foreign objects; use over the counter Debrox as directed.   Tylenol or Motrin every 4 - 6 hours as needed for  ear pain.  Follow up with your PCP in 2-3 of initiating antibiotic ear drops or sooner for no improvement in symptoms  Follow up in the ER for any worsening of symptoms such as new fever, increasing ear pain, neck stiffness, etc.  If you smoke, stop smoking.    Elevated Blood Pressure Reading  Your Blood Pressure was elevated on today.  You should recheck your blood pressure twice a day for the next 2 weeks while follow up with your PCP at your next visit regarding today's reading.    If you have any chest pain, shortness or breath, palpitations, headache, visual disturbances, ect, you should go to the Er.    In the meantime, we recommend you schedule an appointment with your PCP in the next 2-3 days for a recheck of your blood pressure.     Earwax (Treated)    Everyone produces earwax from the lining of the ear canal. It lubricates and protects the ear. The wax that forms in the canal slowly moves toward the outside of the ear and falls out. Sometimes wax can build up in the ear canal. This can cause a blockage and loss of hearing. A buildup of earwax was removed from your ear today.  Home care  If you have a tendency to build up wax in the ear canal, you should clear the wax at home regularly, before it causes discomfort. This should be about once every six months.  · Unless a medicine was prescribed, you may use an over-the-counter product made for clearing earwax. These contain carbamide peroxide and are available over-the-counter in a kit with a small bulb syringe.  · Lie down with the blocked ear facing upward. Apply one dropper full of medicine and wait a few minutes. Grasp the outer ear and wiggle it to help the solution enter the canal.  · Lean over a sink or basin with the blocked  ear turned downward. Use a rubber bulb syringe filled with warm (not hot or cold) water to rinse the ear several times. Use gentle pressure only. You may need to repeat the irrigation several times before the wax flows out.  · If you are having trouble draining all the water out of your ear canal, put a few drops of rubbing alcohol into the ear canal. This will help remove the remaining water.  Don'ts  · Dont use cold water to rinse the ear. This will make you dizzy.  · Dont do this procedure if you have an ear infection. Symptoms include ear pain, fever, or fluid draining from the ear.  · Dont do this procedure if you have a punctured eardrum.  · Dont use cotton swabs, matches, hairpins, keys, or other objects to clean the ear canal. This can cause infection of the ear canal or rupture of the eardrum. Because of their size and shape, cotton swabs can push the earwax deeper into the ear canal instead of removing it.  Follow-up care  Follow up with your healthcare provider, or as advised.  When to seek medical advice  Call your healthcare provider right away if any of these occur:  · Worsening ear pain  · Fever of 100.4°F (38°C) or higher, or as directed by your healthcare provider  · Hearing does not return to normal after three days of treatment  · Fluid drainage or bleeding from the ear canal  · Swelling, redness, or tenderness of the outer ear  · Headache, neck pain, or stiff neck  Date Last Reviewed: 3/22/2015  © 5915-2789 [employer]. [address] [place] [address], [place] [address]. All rights reserved. This information is not intended as a substitute for professional medical care. Always follow your healthcare professional's instructions.        Impacted Earwax    Impacted earwax is a buildup of the natural wax in the ear (cerumen). Impacted earwax is very common. It can cause symptoms such as hearing loss. It can also stop a doctor doing an exam of your ear.  Understanding earwax  Tiny glands in  your ear make substances that combine with dead skin cells to form earwax. Earwax helps protect your ear canal from water, dirt, infection, and injury. Over time, earwax travels from the inner part of your ear canal to the entrance of the canal. Then it falls away naturally. But in some cases, it cant travel to the entrance of the canal. This may be because of a health condition or objects put in the ear. With age, earwax tends to become harder and less fluid. Older adults are more likely to have problems with earwax buildup.  What causes impacted earwax?  Earwax can build up because of many health conditions. Some cause a physical blockage. Others cause too much earwax to be made. Health conditions that can cause earwax buildup include:  · Bony blockage in the ear (osteoma or exostoses)  · Infections, such as swimmers ear (external otitis)  · Skin disease, such as eczema  · Autoimmune diseases, such as lupus  · A narrowed ear canal from birth, chronic inflammation, or injury  · Too much earwax because of injury  · Too much earwax because of  water in the ear canal  Objects repeatedly placed in the ear can also cause impacted earwax. For example, putting cotton swabs in the ear may push the wax deeper into the ear. Over time, this may cause blockage. Hearing aids, swimming plugs, and swim molds can cause the same problem when used again and again.  In some cases, the cause of impacted earwax is not known.  Symptoms of impacted earwax  Excess earwax usually does not cause any symptoms, unless there is a large amount of buildup. Then it may cause symptoms such as:  · Hearing loss  · Earache  · Sense of ear fullness  · Itching in the ear  · Dizziness  · Ringing in the ears  · Cough  Treatment for impacted earwax  If you dont have symptoms, you may not need treatment. Often the earwax goes away on its own with time. If you have symptoms, you may have 1 or more treatments such as:  · Ear drops. These help to soften  the earwax. This helps it leave the ear over time.  · Rinsing (irrigation) of the ear canal with water. This is done in a doctors office.  · Removal of the earwax with small tools. This is also done in a doctors office.  In rare cases, some treatments for earwax removal may cause complications such as:  · Swimmers ear (otitis external)  · Earache  · Short-term hearing loss  · Dizziness  · Water trapped in the ear canal  · Hole in the eardrum  · Ringing in the ears  · Bleeding from the ear  Talk with your health care provider about which risks apply most to you.  Dont use these at home  Health care providers do not advise use of ear candles or ear vacuum kits. These methods are not shown to work.   Preventing impacted earwax  You may not be able to prevent impacted earwax if you have a health condition that causes it, such as eczema. In other cases, you may be able to prevent earwax buildup by:  · Using ear drops once a week  · Having routine cleaning of the ear about every 6 months  · Not using cotton swabs in the ear  When to call the health care provider  Call your health care provider right away if you have severe symptoms after earwax removal. These may include bleeding or severe ear pain.   Date Last Reviewed: 3/19/2015  © 9233-9513 [employer] [address] [place] [address], [place] [address]. All rights reserved. This information is not intended as a substitute for professional medical care. Always follow your healthcare professional's instructions.         Attending Attestation (For Attendings USE Only)...

## 2021-05-17 NOTE — PHYSICAL THERAPY INITIAL EVALUATION ADULT - PATIENT/FAMILY AGREES WITH PLAN
If you are a smoker, it is important for your health to stop smoking. Please be aware that second hand smoke is also harmful.
yes

## 2022-04-08 NOTE — PATIENT PROFILE ADULT. - PATIENT REPRESENTATIVE NAME
04/08/22 1719   Vital Signs   /79   Temp 97.6 °F (36.4 °C)   Pulse 86   Post-Hemodialysis Assessment   Post-Treatment Procedures Blood returned;Catheter capped, clamped and heparinized x 2 ports   Machine Disinfection Process Acid/Vinegar Clean;Heat Disinfect; Exterior Machine Disinfection   Rinseback Volume (ml) 300 ml   Total Liters Processed (l/min) 77.9 l/min   Dialyzer Clearance Lightly streaked   Duration of Treatment (minutes) 240 minutes   Heparin amount administered during treatment (units) 0 units   Hemodialysis Intake (ml) 300 ml   Hemodialysis Output (ml) 2000 ml   NET Removed (ml) 1700 ml   Tolerated Treatment Good   Patient Response to Treatment tolerated well, blood returned, cath care per policy/procedure, lines flushed, heparin instilled, pors capped Jordana Kong

## 2022-08-29 NOTE — PATIENT PROFILE ADULT. - FUNCTIONAL SCREEN CURRENT LEVEL: AMBULATION, MLM
LLE AROM WFL with exception of knee flexion limited 0-80/bilateral upper extremity Active ROM was WFL (within functional limits)/Right LE Active ROM was WFL (within functional limits)
(3) assistive equipment and person

## 2023-12-06 NOTE — DIETITIAN INITIAL EVALUATION ADULT. - FLUIDS
Detail Level: Zone Patient Specific Otc Recommendations (Will Not Stick From Patient To Patient): Starting in 7 nights: liquid compound W nightly, covering with a bandaid 1200 1007

## 2024-01-09 NOTE — H&P ADULT. - CLICK TO LAUNCH ORM
Hospital Medicine History & Physical      PCP: No primary care provider on file.    Date of Admission: 1/9/2024    Date of Service: Pt seen/examined on 1/9/2024     Chief Complaint:    Chief Complaint   Patient presents with    Shortness of Breath     HD patient missed dialysis twice.  60% en route.  Placed on CPAP could barely get him to 70%.           History Of Present Illness:      The patient is a 55 y.o. male with pmhx of ESRD on HD, chronic hypoxic respiratory failure, CAD,CHF, COPD, hx of VTE, DM type 2, ZAINAB who presented to [facility] ED with complaint of shortness of breath and missed dialysis   He had 7 visits to ER last month for similar complaints   He was admitted here a week ago and was aggressively diuresed below dry weight for recurrent complaints of sob  He was added on buspar to cymbalta for anxiety but reports he did not take any meds since he left home  \" I have 38 meds and I dont know what to take \"  Comes with high BP above 200 systolic,severe dyspnea and high sugar above 500 with mild DKA symptoms. Reports taking 10 units lantus daily but not checking sugars  Also has mid sternal chest pain with breathing and is very anxious   Placed on bipap in ER and planned for ICU adm    Similar admission a week ago       Past Medical History:        Diagnosis Date    Ambulatory dysfunction     walker for long distances, SOB with distance    Aortic stenosis     echo 2017    Arthritis     hands and hips    Asthma     Bilateral hilar adenopathy syndrome 06/03/2013    CAD (coronary artery disease)     Dr. Javier Chanel [facility]    Cardiomyopathy ([facility]) 04/19/2019    EF= 43%    CHF (congestive heart failure) ([facility])     Chronic pain     COPD (chronic obstructive pulmonary disease) ([facility])     pulmonology Dr. Batista    CVA (cerebral vascular accident) ([facility]) 05/21/2017    Depression     Diabetes mellitus ([facility])     borderline    Difficult intravenous access     Emphysema of lung ([facility])     ESRD (end stage renal  .